# Patient Record
Sex: FEMALE | Race: WHITE | NOT HISPANIC OR LATINO | URBAN - METROPOLITAN AREA
[De-identification: names, ages, dates, MRNs, and addresses within clinical notes are randomized per-mention and may not be internally consistent; named-entity substitution may affect disease eponyms.]

---

## 2017-09-14 ENCOUNTER — OUTPATIENT (OUTPATIENT)
Dept: OUTPATIENT SERVICES | Facility: HOSPITAL | Age: 72
LOS: 1 days | End: 2017-09-14
Payer: COMMERCIAL

## 2017-09-14 ENCOUNTER — APPOINTMENT (OUTPATIENT)
Dept: CV DIAGNOSITCS | Facility: HOSPITAL | Age: 72
End: 2017-09-14

## 2017-09-14 DIAGNOSIS — I10 ESSENTIAL (PRIMARY) HYPERTENSION: ICD-10-CM

## 2017-09-14 PROCEDURE — 93306 TTE W/DOPPLER COMPLETE: CPT | Mod: 26

## 2017-09-14 PROCEDURE — 93306 TTE W/DOPPLER COMPLETE: CPT

## 2018-09-13 ENCOUNTER — INPATIENT (INPATIENT)
Facility: HOSPITAL | Age: 73
LOS: 3 days | Discharge: HOME HEALTH | DRG: 641 | End: 2018-09-17
Attending: INTERNAL MEDICINE | Admitting: INTERNAL MEDICINE
Payer: COMMERCIAL

## 2018-09-13 VITALS
HEIGHT: 61 IN | DIASTOLIC BLOOD PRESSURE: 73 MMHG | TEMPERATURE: 98 F | RESPIRATION RATE: 18 BRPM | HEART RATE: 55 BPM | WEIGHT: 132.06 LBS | OXYGEN SATURATION: 98 % | SYSTOLIC BLOOD PRESSURE: 118 MMHG

## 2018-09-13 DIAGNOSIS — E87.1 HYPO-OSMOLALITY AND HYPONATREMIA: ICD-10-CM

## 2018-09-13 DIAGNOSIS — C55 MALIGNANT NEOPLASM OF UTERUS, PART UNSPECIFIED: ICD-10-CM

## 2018-09-13 DIAGNOSIS — Z87.39 PERSONAL HISTORY OF OTHER DISEASES OF THE MUSCULOSKELETAL SYSTEM AND CONNECTIVE TISSUE: ICD-10-CM

## 2018-09-13 DIAGNOSIS — E87.6 HYPOKALEMIA: ICD-10-CM

## 2018-09-13 DIAGNOSIS — I10 ESSENTIAL (PRIMARY) HYPERTENSION: ICD-10-CM

## 2018-09-13 LAB
ALBUMIN SERPL ELPH-MCNC: 4.7 G/DL — SIGNIFICANT CHANGE UP (ref 3.3–5)
ALP SERPL-CCNC: 52 U/L — SIGNIFICANT CHANGE UP (ref 40–120)
ALT FLD-CCNC: 12 U/L — SIGNIFICANT CHANGE UP (ref 10–45)
ANION GAP SERPL CALC-SCNC: 13 MMOL/L — SIGNIFICANT CHANGE UP (ref 5–17)
ANION GAP SERPL CALC-SCNC: 14 MMOL/L — SIGNIFICANT CHANGE UP (ref 5–17)
ANION GAP SERPL CALC-SCNC: 16 MMOL/L — SIGNIFICANT CHANGE UP (ref 5–17)
APPEARANCE UR: CLEAR — SIGNIFICANT CHANGE UP
APTT BLD: 30.4 SEC — SIGNIFICANT CHANGE UP (ref 27.5–37.4)
AST SERPL-CCNC: 21 U/L — SIGNIFICANT CHANGE UP (ref 10–40)
BACTERIA # UR AUTO: NEGATIVE — SIGNIFICANT CHANGE UP
BASOPHILS # BLD AUTO: 0 K/UL — SIGNIFICANT CHANGE UP (ref 0–0.2)
BASOPHILS NFR BLD AUTO: 0.6 % — SIGNIFICANT CHANGE UP (ref 0–2)
BILIRUB SERPL-MCNC: 0.7 MG/DL — SIGNIFICANT CHANGE UP (ref 0.2–1.2)
BILIRUB UR-MCNC: NEGATIVE — SIGNIFICANT CHANGE UP
BUN SERPL-MCNC: 5 MG/DL — LOW (ref 7–23)
BUN SERPL-MCNC: 6 MG/DL — LOW (ref 7–23)
BUN SERPL-MCNC: 6 MG/DL — LOW (ref 7–23)
CALCIUM SERPL-MCNC: 8.9 MG/DL — SIGNIFICANT CHANGE UP (ref 8.4–10.5)
CALCIUM SERPL-MCNC: 9.6 MG/DL — SIGNIFICANT CHANGE UP (ref 8.4–10.5)
CALCIUM SERPL-MCNC: 9.7 MG/DL — SIGNIFICANT CHANGE UP (ref 8.4–10.5)
CHLORIDE SERPL-SCNC: 71 MMOL/L — LOW (ref 96–108)
CHLORIDE SERPL-SCNC: 73 MMOL/L — LOW (ref 96–108)
CHLORIDE SERPL-SCNC: 90 MMOL/L — LOW (ref 96–108)
CO2 SERPL-SCNC: 26 MMOL/L — SIGNIFICANT CHANGE UP (ref 22–31)
CO2 SERPL-SCNC: 28 MMOL/L — SIGNIFICANT CHANGE UP (ref 22–31)
CO2 SERPL-SCNC: 29 MMOL/L — SIGNIFICANT CHANGE UP (ref 22–31)
COLOR SPEC: SIGNIFICANT CHANGE UP
CREAT ?TM UR-MCNC: 8 MG/DL — SIGNIFICANT CHANGE UP
CREAT SERPL-MCNC: 0.49 MG/DL — LOW (ref 0.5–1.3)
CREAT SERPL-MCNC: 0.51 MG/DL — SIGNIFICANT CHANGE UP (ref 0.5–1.3)
CREAT SERPL-MCNC: 0.51 MG/DL — SIGNIFICANT CHANGE UP (ref 0.5–1.3)
DIFF PNL FLD: NEGATIVE — SIGNIFICANT CHANGE UP
EOSINOPHIL # BLD AUTO: 0 K/UL — SIGNIFICANT CHANGE UP (ref 0–0.5)
EOSINOPHIL NFR BLD AUTO: 0.4 % — SIGNIFICANT CHANGE UP (ref 0–6)
EPI CELLS # UR: 0 /HPF — SIGNIFICANT CHANGE UP
GLUCOSE SERPL-MCNC: 103 MG/DL — HIGH (ref 70–99)
GLUCOSE SERPL-MCNC: 104 MG/DL — HIGH (ref 70–99)
GLUCOSE SERPL-MCNC: 127 MG/DL — HIGH (ref 70–99)
GLUCOSE UR QL: NEGATIVE — SIGNIFICANT CHANGE UP
HCT VFR BLD CALC: 40.3 % — SIGNIFICANT CHANGE UP (ref 34.5–45)
HGB BLD-MCNC: 14.6 G/DL — SIGNIFICANT CHANGE UP (ref 11.5–15.5)
HYALINE CASTS # UR AUTO: 0 /LPF — SIGNIFICANT CHANGE UP (ref 0–2)
INR BLD: 1.1 RATIO — SIGNIFICANT CHANGE UP (ref 0.88–1.16)
KETONES UR-MCNC: NEGATIVE — SIGNIFICANT CHANGE UP
LACTATE BLDV-MCNC: 2.2 MMOL/L — HIGH (ref 0.7–2)
LEUKOCYTE ESTERASE UR-ACNC: NEGATIVE — SIGNIFICANT CHANGE UP
LIDOCAIN IGE QN: 90 U/L — HIGH (ref 7–60)
LYMPHOCYTES # BLD AUTO: 1.4 K/UL — SIGNIFICANT CHANGE UP (ref 1–3.3)
LYMPHOCYTES # BLD AUTO: 22.1 % — SIGNIFICANT CHANGE UP (ref 13–44)
MAGNESIUM SERPL-MCNC: 2 MG/DL — SIGNIFICANT CHANGE UP (ref 1.6–2.6)
MCHC RBC-ENTMCNC: 31.3 PG — SIGNIFICANT CHANGE UP (ref 27–34)
MCHC RBC-ENTMCNC: 36.3 GM/DL — HIGH (ref 32–36)
MCV RBC AUTO: 86.1 FL — SIGNIFICANT CHANGE UP (ref 80–100)
MONOCYTES # BLD AUTO: 0.9 K/UL — SIGNIFICANT CHANGE UP (ref 0–0.9)
MONOCYTES NFR BLD AUTO: 14.8 % — HIGH (ref 2–14)
NEUTROPHILS # BLD AUTO: 3.9 K/UL — SIGNIFICANT CHANGE UP (ref 1.8–7.4)
NEUTROPHILS NFR BLD AUTO: 62 % — SIGNIFICANT CHANGE UP (ref 43–77)
NITRITE UR-MCNC: NEGATIVE — SIGNIFICANT CHANGE UP
OSMOLALITY SERPL: 240 MOS/KG — LOW (ref 275–300)
OSMOLALITY UR: 122 MOS/KG — LOW (ref 300–900)
OSMOLALITY UR: 146 MOS/KG — SIGNIFICANT CHANGE UP (ref 50–1200)
PH UR: 7.5 — SIGNIFICANT CHANGE UP (ref 5–8)
PLATELET # BLD AUTO: 325 K/UL — SIGNIFICANT CHANGE UP (ref 150–400)
POTASSIUM SERPL-MCNC: 2.8 MMOL/L — CRITICAL LOW (ref 3.5–5.3)
POTASSIUM SERPL-MCNC: 2.9 MMOL/L — CRITICAL LOW (ref 3.5–5.3)
POTASSIUM SERPL-MCNC: 3.3 MMOL/L — LOW (ref 3.5–5.3)
POTASSIUM SERPL-SCNC: 2.8 MMOL/L — CRITICAL LOW (ref 3.5–5.3)
POTASSIUM SERPL-SCNC: 2.9 MMOL/L — CRITICAL LOW (ref 3.5–5.3)
POTASSIUM SERPL-SCNC: 3.3 MMOL/L — LOW (ref 3.5–5.3)
POTASSIUM UR-SCNC: 7 MMOL/L — SIGNIFICANT CHANGE UP
PROT SERPL-MCNC: 7.5 G/DL — SIGNIFICANT CHANGE UP (ref 6–8.3)
PROT UR-MCNC: NEGATIVE — SIGNIFICANT CHANGE UP
PROTHROM AB SERPL-ACNC: 11.9 SEC — SIGNIFICANT CHANGE UP (ref 9.8–12.7)
RBC # BLD: 4.68 M/UL — SIGNIFICANT CHANGE UP (ref 3.8–5.2)
RBC # FLD: 11.5 % — SIGNIFICANT CHANGE UP (ref 10.3–14.5)
RBC CASTS # UR COMP ASSIST: 2 /HPF — SIGNIFICANT CHANGE UP (ref 0–4)
SODIUM SERPL-SCNC: 115 MMOL/L — CRITICAL LOW (ref 135–145)
SODIUM SERPL-SCNC: 116 MMOL/L — CRITICAL LOW (ref 135–145)
SODIUM SERPL-SCNC: 129 MMOL/L — LOW (ref 135–145)
SODIUM UR-SCNC: 20 MMOL/L — SIGNIFICANT CHANGE UP
SODIUM UR-SCNC: 35 MMOL/L — SIGNIFICANT CHANGE UP
SP GR SPEC: 1.01 — SIGNIFICANT CHANGE UP (ref 1.01–1.02)
UROBILINOGEN FLD QL: NEGATIVE — SIGNIFICANT CHANGE UP
WBC # BLD: 6.3 K/UL — SIGNIFICANT CHANGE UP (ref 3.8–10.5)
WBC # FLD AUTO: 6.3 K/UL — SIGNIFICANT CHANGE UP (ref 3.8–10.5)
WBC UR QL: 1 /HPF — SIGNIFICANT CHANGE UP (ref 0–5)

## 2018-09-13 PROCEDURE — 93010 ELECTROCARDIOGRAM REPORT: CPT

## 2018-09-13 PROCEDURE — 74177 CT ABD & PELVIS W/CONTRAST: CPT | Mod: 26

## 2018-09-13 PROCEDURE — 99285 EMERGENCY DEPT VISIT HI MDM: CPT | Mod: 25

## 2018-09-13 RX ORDER — POTASSIUM CHLORIDE 20 MEQ
10 PACKET (EA) ORAL
Qty: 0 | Refills: 0 | Status: COMPLETED | OUTPATIENT
Start: 2018-09-13 | End: 2018-09-14

## 2018-09-13 RX ORDER — POTASSIUM CHLORIDE 20 MEQ
40 PACKET (EA) ORAL EVERY 4 HOURS
Qty: 0 | Refills: 0 | Status: COMPLETED | OUTPATIENT
Start: 2018-09-13 | End: 2018-09-14

## 2018-09-13 RX ORDER — INFLUENZA VIRUS VACCINE 15; 15; 15; 15 UG/.5ML; UG/.5ML; UG/.5ML; UG/.5ML
0.5 SUSPENSION INTRAMUSCULAR ONCE
Qty: 0 | Refills: 0 | Status: COMPLETED | OUTPATIENT
Start: 2018-09-13 | End: 2018-09-17

## 2018-09-13 RX ORDER — METOPROLOL TARTRATE 50 MG
100 TABLET ORAL
Qty: 0 | Refills: 0 | COMMUNITY

## 2018-09-13 RX ORDER — ENOXAPARIN SODIUM 100 MG/ML
40 INJECTION SUBCUTANEOUS DAILY
Qty: 0 | Refills: 0 | Status: DISCONTINUED | OUTPATIENT
Start: 2018-09-13 | End: 2018-09-17

## 2018-09-13 RX ORDER — POTASSIUM CHLORIDE 20 MEQ
10 PACKET (EA) ORAL ONCE
Qty: 0 | Refills: 0 | Status: COMPLETED | OUTPATIENT
Start: 2018-09-13 | End: 2018-09-13

## 2018-09-13 RX ORDER — SODIUM CHLORIDE 5 G/100ML
500 INJECTION, SOLUTION INTRAVENOUS
Qty: 0 | Refills: 0 | Status: DISCONTINUED | OUTPATIENT
Start: 2018-09-13 | End: 2018-09-13

## 2018-09-13 RX ORDER — POTASSIUM CHLORIDE 20 MEQ
20 PACKET (EA) ORAL ONCE
Qty: 0 | Refills: 0 | Status: DISCONTINUED | OUTPATIENT
Start: 2018-09-13 | End: 2018-09-13

## 2018-09-13 RX ORDER — SODIUM CHLORIDE 9 MG/ML
1000 INJECTION INTRAMUSCULAR; INTRAVENOUS; SUBCUTANEOUS ONCE
Qty: 0 | Refills: 0 | Status: COMPLETED | OUTPATIENT
Start: 2018-09-13 | End: 2018-09-13

## 2018-09-13 RX ORDER — POTASSIUM CHLORIDE 20 MEQ
40 PACKET (EA) ORAL ONCE
Qty: 0 | Refills: 0 | Status: DISCONTINUED | OUTPATIENT
Start: 2018-09-13 | End: 2018-09-13

## 2018-09-13 RX ORDER — POTASSIUM CHLORIDE 20 MEQ
40 PACKET (EA) ORAL ONCE
Qty: 0 | Refills: 0 | Status: COMPLETED | OUTPATIENT
Start: 2018-09-13 | End: 2018-09-13

## 2018-09-13 RX ORDER — ACETAMINOPHEN 500 MG
1000 TABLET ORAL ONCE
Qty: 0 | Refills: 0 | Status: COMPLETED | OUTPATIENT
Start: 2018-09-13 | End: 2018-09-13

## 2018-09-13 RX ORDER — PANTOPRAZOLE SODIUM 20 MG/1
40 TABLET, DELAYED RELEASE ORAL
Qty: 0 | Refills: 0 | Status: DISCONTINUED | OUTPATIENT
Start: 2018-09-13 | End: 2018-09-17

## 2018-09-13 RX ORDER — ZOLPIDEM TARTRATE 10 MG/1
5 TABLET ORAL AT BEDTIME
Qty: 0 | Refills: 0 | Status: DISCONTINUED | OUTPATIENT
Start: 2018-09-13 | End: 2018-09-17

## 2018-09-13 RX ORDER — OXYCODONE AND ACETAMINOPHEN 5; 325 MG/1; MG/1
2 TABLET ORAL EVERY 4 HOURS
Qty: 0 | Refills: 0 | Status: DISCONTINUED | OUTPATIENT
Start: 2018-09-13 | End: 2018-09-17

## 2018-09-13 RX ORDER — DULOXETINE HYDROCHLORIDE 30 MG/1
1 CAPSULE, DELAYED RELEASE ORAL
Qty: 0 | Refills: 0 | COMMUNITY

## 2018-09-13 RX ORDER — SODIUM CHLORIDE 9 MG/ML
1000 INJECTION, SOLUTION INTRAVENOUS
Qty: 0 | Refills: 0 | Status: DISCONTINUED | OUTPATIENT
Start: 2018-09-13 | End: 2018-09-14

## 2018-09-13 RX ORDER — POTASSIUM CHLORIDE 20 MEQ
1 PACKET (EA) ORAL
Qty: 0 | Refills: 0 | COMMUNITY

## 2018-09-13 RX ORDER — LISINOPRIL 2.5 MG/1
20 TABLET ORAL DAILY
Qty: 0 | Refills: 0 | Status: DISCONTINUED | OUTPATIENT
Start: 2018-09-13 | End: 2018-09-13

## 2018-09-13 RX ADMIN — PANTOPRAZOLE SODIUM 40 MILLIGRAM(S): 20 TABLET, DELAYED RELEASE ORAL at 18:49

## 2018-09-13 RX ADMIN — Medication 1000 MILLIGRAM(S): at 11:43

## 2018-09-13 RX ADMIN — Medication 40 MILLIEQUIVALENT(S): at 12:34

## 2018-09-13 RX ADMIN — Medication 40 MILLIEQUIVALENT(S): at 23:51

## 2018-09-13 RX ADMIN — ZOLPIDEM TARTRATE 5 MILLIGRAM(S): 10 TABLET ORAL at 20:22

## 2018-09-13 RX ADMIN — Medication 1000 MILLIGRAM(S): at 11:36

## 2018-09-13 RX ADMIN — Medication 100 MILLIEQUIVALENT(S): at 13:15

## 2018-09-13 RX ADMIN — SODIUM CHLORIDE 1000 MILLILITER(S): 9 INJECTION INTRAMUSCULAR; INTRAVENOUS; SUBCUTANEOUS at 12:43

## 2018-09-13 RX ADMIN — SODIUM CHLORIDE 1000 MILLILITER(S): 9 INJECTION INTRAMUSCULAR; INTRAVENOUS; SUBCUTANEOUS at 10:14

## 2018-09-13 RX ADMIN — Medication 400 MILLIGRAM(S): at 10:13

## 2018-09-13 RX ADMIN — SODIUM CHLORIDE 30 MILLILITER(S): 5 INJECTION, SOLUTION INTRAVENOUS at 18:49

## 2018-09-13 RX ADMIN — ENOXAPARIN SODIUM 40 MILLIGRAM(S): 100 INJECTION SUBCUTANEOUS at 18:49

## 2018-09-13 RX ADMIN — Medication 100 MILLIEQUIVALENT(S): at 23:51

## 2018-09-13 NOTE — ED PROVIDER NOTE - PMH
Age Related Osteoporosis    History of Scoliosis  with dietrich's mateo placement about 13 years ago  HTN (Hypertension)    Malignant Neoplasm of Corpus Uteri

## 2018-09-13 NOTE — ED PROCEDURE NOTE - PROCEDURE ADDITIONAL DETAILS
Emergency Department Focused Ultrasound performed at patient's bedside for educational purposes. The study will have a follow up study performed or was performed in the direct supervision of an ultrasound trained attending.  - neg fast  - ruq: no cholecystitis  -bladder and Renal: no hydro, volume post void bladder 350cc

## 2018-09-13 NOTE — ED ADULT NURSE NOTE - OBJECTIVE STATEMENT
72 yr old 72 F sent in by PMD for Na of 115.  Pt c/o  abdominal pain and constipation for 5 days. Pt has normal constipation, however never with pain. Also complaining of "just not feeling well" and poor balance. Has poor appetite, has been passing gas.  Pain is described in "mid abdomen" and radiates to RLQ. Denies vomiting,  dysuria, blood in stool or urine, double vision. Denies numbness or weakness. No facial droop or slurred speech. Afebrile Denies fever or chills.Takes hydrocodone for chronic pain due to scoliosis. PMD Peter Perdik

## 2018-09-13 NOTE — H&P ADULT - PROBLEM SELECTOR PLAN 1
severe  will send urine and serum osm studies  renal evaluation called Dr Mtz to see  avoid too rapid a correction of Na  patient given NS in Emergency Department before admission severe  likely secondary to Cymbalta  will discontinue same  will send urine and serum osm studies  renal evaluation called Dr Mtz to see  avoid too rapid a correction of Na  patient given NS in Emergency Department before admission

## 2018-09-13 NOTE — ED PROVIDER NOTE - OBJECTIVE STATEMENT
72 F sent in by PMD for Na of 115. complaining of abdominal pain and constipation for 5 days; is constipated at baseline but not with pain. Also complaining of "just not feeling well" and poor balance. Has poor appetite, has been passing gas.  Pain is described in "mid abdomen" and radiates to RLQ. Denies vomiting, f/c, dysuria, blood in stool or urine, dystonia/dysphagia, double vision. Takes hydrocodone for chronic pain due to scoliosis. PMD Peter Perdik

## 2018-09-13 NOTE — H&P ADULT - NSHPPHYSICALEXAM_GEN_ALL_CORE
PHYSICAL EXAM: vital signs as above  in no apparent distress  HEENT: CINDY EOMI dry oral mucosa  Neck: Supple, no JVD, no thyromegaly  Lungs: no rhonchi, no wheeze, no crackles  CVS: S1 S2 soft ejection systolic murmur best heard at left sternal border, fletcher  Abdomen: no tenderness, no organomegaly, BS present   left paraumbilical chronic reducible hernia  Neuro: AO x 3 no focal weakness, no sensory abnormalities  Psych: appropriate affect  Skin: warm, dry  Ext: no cyanosis or clubbing, no edema  Msk: no joint swelling or deformities  Back: no CVA tenderness, no kyphosis/scoliosis

## 2018-09-13 NOTE — H&P ADULT - ASSESSMENT
72 F with a PMH uterine ca in remission, HTN, chronic back pain sent in by PMD for a sodium level of 115. For the last 10 days she has been complaining of abdominal pain and constipation for 5 days. Labs significant for severe hyponatremia and hypokalemia, patient denies excessive water intake

## 2018-09-13 NOTE — H&P ADULT - NSHPREVIEWOFSYSTEMS_GEN_ALL_CORE
Gen: no loss of wt + loss of appetite  ENT: no dizziness no hearing loss  Ophth: no blurring of vision no loss of vision  Resp: No cough no sputum production  CVS: No chest pain no palpitations no orthopnea  GI: see above HPI   : no dysuria, hematuria  Endo: no polyuria no excessive sweating  Neuro: no weakness no paresthesias poor balance while walking  Psych: No suicidal no depressive ideation  Heme: No petechiae no easy bruising  Msk: No joint pain no swelling  Skin: No rash no itching  All other ROS negative

## 2018-09-13 NOTE — CHART NOTE - NSCHARTNOTEFT_GEN_A_CORE
Pt was seen and examined.  Briefly this is a female c hx HTN uterine cancer pw Hyponatremia that is euvolemic-  Likely a combination of SIADH(Lexapro + nausea) and "tea and toast" diet    Suggest  -3% NS at 30 cc/hr for 10 hours  -Serum sodium q 6 hours.    -Check urine lytes and osmolarity  -Check TSH  -Add Ensure Enlive and pudding  -DC Lisinopril given soft BP      Sayed Smith  [facility]  [phone]

## 2018-09-13 NOTE — CHART NOTE - NSCHARTNOTEFT_GEN_A_CORE
Informed Nephrology MD Gonzalez that the Na corrected from 115 to 129, the 3% hypertonic saline was started at 6 pm  as per chart review and at 22:00 Na is 129.   -Will stop hypertonic saline  -Will start D5W at 200 until 7am   -Will f/u with BMP     -Nephro to follow in AM     Samira [place] [id number]

## 2018-09-13 NOTE — H&P ADULT - PSH
History of Hemorrhoidectomy    History of Tonsillectomy    S/P Appendectomy    S/P Cervical Polypectomy    S/P  Section   &   s/p left shoulder surgery    S/P Wrist Surgery  right side  Scoliosis of Thoracic Spine  s/p dietrich's mateo placement 13 years ago

## 2018-09-13 NOTE — ED PROVIDER NOTE - MEDICAL DECISION MAKING DETAILS
Patient with hyponatremia for which she was sent in, also having decreased PO intake and constipation/ABD pain. On exam, has LLQ and periumbilical tenderness. Concern for SBO vs stool impaction and electrolyte abnormalities. Will CT, labs, give fluids. Mary: Patient with hyponatremia for which she was sent in, also having decreased PO intake and constipation/ABD pain. On exam, has LLQ and periumbilical tenderness. Concern for SBO vs stool impaction and electrolyte abnormalities. Will CT, labs, give fluids.

## 2018-09-13 NOTE — ED ADULT NURSE NOTE - NSIMPLEMENTINTERV_GEN_ALL_ED
Implemented All Universal Safety Interventions:  [place] to call system. Call bell, personal items and telephone within reach. Instruct patient to call for assistance. Room bathroom lighting operational. Non-slip footwear when patient is off stretcher. Physically safe environment: no spills, clutter or unnecessary equipment. Stretcher in lowest position, wheels locked, appropriate side rails in place.

## 2018-09-13 NOTE — H&P ADULT - HISTORY OF PRESENT ILLNESS
72 F with a PMH uterine ca in remission, HTN, chronic back pain sent in by PMD for a sodium level of 115. For the last 10 days she has been complaining of abdominal pain and constipation for 5 days. She states she is constipated at baseline but does not usually have pain. Also complaining of "just not feeling well" and poor balance. She has no appetite. She has no nausea, vomiting and has been passing gas.  Pain is described in "mid abdomen" and radiates to RLQ. Denies vomiting, f/c, dysuria, blood in stool or urine, dystonia/dysphagia, double vision. Takes hydrocodone for chronic pain due to scoliosis. PMD Rey Pratt. Of note she has been started on Cymbalta 7 days prior by her PCP

## 2018-09-14 LAB
ANION GAP SERPL CALC-SCNC: 11 MMOL/L — SIGNIFICANT CHANGE UP (ref 5–17)
ANION GAP SERPL CALC-SCNC: 12 MMOL/L — SIGNIFICANT CHANGE UP (ref 5–17)
ANION GAP SERPL CALC-SCNC: 12 MMOL/L — SIGNIFICANT CHANGE UP (ref 5–17)
ANION GAP SERPL CALC-SCNC: 9 MMOL/L — SIGNIFICANT CHANGE UP (ref 5–17)
BUN SERPL-MCNC: 5 MG/DL — LOW (ref 7–23)
BUN SERPL-MCNC: 8 MG/DL — SIGNIFICANT CHANGE UP (ref 7–23)
BUN SERPL-MCNC: 9 MG/DL — SIGNIFICANT CHANGE UP (ref 7–23)
BUN SERPL-MCNC: <4 MG/DL — LOW (ref 7–23)
CALCIUM SERPL-MCNC: 8.8 MG/DL — SIGNIFICANT CHANGE UP (ref 8.4–10.5)
CALCIUM SERPL-MCNC: 9.3 MG/DL — SIGNIFICANT CHANGE UP (ref 8.4–10.5)
CALCIUM SERPL-MCNC: 9.4 MG/DL — SIGNIFICANT CHANGE UP (ref 8.4–10.5)
CALCIUM SERPL-MCNC: 9.5 MG/DL — SIGNIFICANT CHANGE UP (ref 8.4–10.5)
CHLORIDE SERPL-SCNC: 86 MMOL/L — LOW (ref 96–108)
CHLORIDE SERPL-SCNC: 88 MMOL/L — LOW (ref 96–108)
CHLORIDE SERPL-SCNC: 92 MMOL/L — LOW (ref 96–108)
CHLORIDE SERPL-SCNC: 93 MMOL/L — LOW (ref 96–108)
CO2 SERPL-SCNC: 25 MMOL/L — SIGNIFICANT CHANGE UP (ref 22–31)
CO2 SERPL-SCNC: 26 MMOL/L — SIGNIFICANT CHANGE UP (ref 22–31)
CORTIS AM PEAK SERPL-MCNC: 11.8 UG/DL — SIGNIFICANT CHANGE UP (ref 6–18.4)
CREAT SERPL-MCNC: 0.45 MG/DL — LOW (ref 0.5–1.3)
CREAT SERPL-MCNC: 0.49 MG/DL — LOW (ref 0.5–1.3)
GLUCOSE SERPL-MCNC: 105 MG/DL — HIGH (ref 70–99)
GLUCOSE SERPL-MCNC: 123 MG/DL — HIGH (ref 70–99)
GLUCOSE SERPL-MCNC: 152 MG/DL — HIGH (ref 70–99)
GLUCOSE SERPL-MCNC: 179 MG/DL — HIGH (ref 70–99)
HCT VFR BLD CALC: 33.4 % — LOW (ref 34.5–45)
HGB BLD-MCNC: 12.2 G/DL — SIGNIFICANT CHANGE UP (ref 11.5–15.5)
MCHC RBC-ENTMCNC: 30.8 PG — SIGNIFICANT CHANGE UP (ref 27–34)
MCHC RBC-ENTMCNC: 36.5 GM/DL — HIGH (ref 32–36)
MCV RBC AUTO: 84.3 FL — SIGNIFICANT CHANGE UP (ref 80–100)
PLATELET # BLD AUTO: 213 K/UL — SIGNIFICANT CHANGE UP (ref 150–400)
POTASSIUM SERPL-MCNC: 3.5 MMOL/L — SIGNIFICANT CHANGE UP (ref 3.5–5.3)
POTASSIUM SERPL-MCNC: 3.6 MMOL/L — SIGNIFICANT CHANGE UP (ref 3.5–5.3)
POTASSIUM SERPL-MCNC: 4.2 MMOL/L — SIGNIFICANT CHANGE UP (ref 3.5–5.3)
POTASSIUM SERPL-MCNC: 4.6 MMOL/L — SIGNIFICANT CHANGE UP (ref 3.5–5.3)
POTASSIUM SERPL-SCNC: 3.5 MMOL/L — SIGNIFICANT CHANGE UP (ref 3.5–5.3)
POTASSIUM SERPL-SCNC: 3.6 MMOL/L — SIGNIFICANT CHANGE UP (ref 3.5–5.3)
POTASSIUM SERPL-SCNC: 4.2 MMOL/L — SIGNIFICANT CHANGE UP (ref 3.5–5.3)
POTASSIUM SERPL-SCNC: 4.6 MMOL/L — SIGNIFICANT CHANGE UP (ref 3.5–5.3)
RBC # BLD: 3.96 M/UL — SIGNIFICANT CHANGE UP (ref 3.8–5.2)
RBC # FLD: 12.3 % — SIGNIFICANT CHANGE UP (ref 10.3–14.5)
SODIUM SERPL-SCNC: 121 MMOL/L — LOW (ref 135–145)
SODIUM SERPL-SCNC: 125 MMOL/L — LOW (ref 135–145)
SODIUM SERPL-SCNC: 129 MMOL/L — LOW (ref 135–145)
SODIUM SERPL-SCNC: 131 MMOL/L — LOW (ref 135–145)
TSH SERPL-MCNC: 1.48 UIU/ML — SIGNIFICANT CHANGE UP (ref 0.27–4.2)
WBC # BLD: 4.5 K/UL — SIGNIFICANT CHANGE UP (ref 3.8–10.5)
WBC # FLD AUTO: 4.5 K/UL — SIGNIFICANT CHANGE UP (ref 3.8–10.5)

## 2018-09-14 RX ORDER — POTASSIUM CHLORIDE 20 MEQ
40 PACKET (EA) ORAL ONCE
Qty: 0 | Refills: 0 | Status: COMPLETED | OUTPATIENT
Start: 2018-09-14 | End: 2018-09-14

## 2018-09-14 RX ORDER — POTASSIUM CHLORIDE 20 MEQ
20 PACKET (EA) ORAL ONCE
Qty: 0 | Refills: 0 | Status: DISCONTINUED | OUTPATIENT
Start: 2018-09-14 | End: 2018-09-14

## 2018-09-14 RX ADMIN — SODIUM CHLORIDE 200 MILLILITER(S): 9 INJECTION, SOLUTION INTRAVENOUS at 00:12

## 2018-09-14 RX ADMIN — OXYCODONE AND ACETAMINOPHEN 2 TABLET(S): 5; 325 TABLET ORAL at 14:18

## 2018-09-14 RX ADMIN — OXYCODONE AND ACETAMINOPHEN 2 TABLET(S): 5; 325 TABLET ORAL at 20:18

## 2018-09-14 RX ADMIN — Medication 40 MILLIEQUIVALENT(S): at 10:30

## 2018-09-14 RX ADMIN — PANTOPRAZOLE SODIUM 40 MILLIGRAM(S): 20 TABLET, DELAYED RELEASE ORAL at 06:01

## 2018-09-14 RX ADMIN — Medication 100 MILLIEQUIVALENT(S): at 02:01

## 2018-09-14 RX ADMIN — OXYCODONE AND ACETAMINOPHEN 2 TABLET(S): 5; 325 TABLET ORAL at 13:18

## 2018-09-14 RX ADMIN — Medication 100 MILLIEQUIVALENT(S): at 00:47

## 2018-09-14 RX ADMIN — OXYCODONE AND ACETAMINOPHEN 2 TABLET(S): 5; 325 TABLET ORAL at 19:11

## 2018-09-14 RX ADMIN — ZOLPIDEM TARTRATE 5 MILLIGRAM(S): 10 TABLET ORAL at 00:20

## 2018-09-14 RX ADMIN — ZOLPIDEM TARTRATE 5 MILLIGRAM(S): 10 TABLET ORAL at 22:55

## 2018-09-14 RX ADMIN — Medication 40 MILLIEQUIVALENT(S): at 04:24

## 2018-09-14 RX ADMIN — ENOXAPARIN SODIUM 40 MILLIGRAM(S): 100 INJECTION SUBCUTANEOUS at 13:17

## 2018-09-15 LAB
ANION GAP SERPL CALC-SCNC: 11 MMOL/L — SIGNIFICANT CHANGE UP (ref 5–17)
BUN SERPL-MCNC: 6 MG/DL — LOW (ref 7–23)
CALCIUM SERPL-MCNC: 9.7 MG/DL — SIGNIFICANT CHANGE UP (ref 8.4–10.5)
CHLORIDE SERPL-SCNC: 91 MMOL/L — LOW (ref 96–108)
CO2 SERPL-SCNC: 27 MMOL/L — SIGNIFICANT CHANGE UP (ref 22–31)
CREAT SERPL-MCNC: 0.45 MG/DL — LOW (ref 0.5–1.3)
CULTURE RESULTS: SIGNIFICANT CHANGE UP
GLUCOSE SERPL-MCNC: 105 MG/DL — HIGH (ref 70–99)
HCT VFR BLD CALC: 36.4 % — SIGNIFICANT CHANGE UP (ref 34.5–45)
HGB BLD-MCNC: 12.6 G/DL — SIGNIFICANT CHANGE UP (ref 11.5–15.5)
MCHC RBC-ENTMCNC: 30 PG — SIGNIFICANT CHANGE UP (ref 27–34)
MCHC RBC-ENTMCNC: 34.6 GM/DL — SIGNIFICANT CHANGE UP (ref 32–36)
MCV RBC AUTO: 86.7 FL — SIGNIFICANT CHANGE UP (ref 80–100)
PLATELET # BLD AUTO: 264 K/UL — SIGNIFICANT CHANGE UP (ref 150–400)
POTASSIUM SERPL-MCNC: 3.5 MMOL/L — SIGNIFICANT CHANGE UP (ref 3.5–5.3)
POTASSIUM SERPL-SCNC: 3.5 MMOL/L — SIGNIFICANT CHANGE UP (ref 3.5–5.3)
RBC # BLD: 4.2 M/UL — SIGNIFICANT CHANGE UP (ref 3.8–5.2)
RBC # FLD: 12.7 % — SIGNIFICANT CHANGE UP (ref 10.3–14.5)
SODIUM SERPL-SCNC: 129 MMOL/L — LOW (ref 135–145)
SPECIMEN SOURCE: SIGNIFICANT CHANGE UP
WBC # BLD: 4.98 K/UL — SIGNIFICANT CHANGE UP (ref 3.8–10.5)
WBC # FLD AUTO: 4.98 K/UL — SIGNIFICANT CHANGE UP (ref 3.8–10.5)

## 2018-09-15 RX ORDER — SODIUM CHLORIDE 9 MG/ML
1000 INJECTION INTRAMUSCULAR; INTRAVENOUS; SUBCUTANEOUS
Qty: 0 | Refills: 0 | Status: DISCONTINUED | OUTPATIENT
Start: 2018-09-15 | End: 2018-09-16

## 2018-09-15 RX ORDER — KETOROLAC TROMETHAMINE 30 MG/ML
15 SYRINGE (ML) INJECTION ONCE
Qty: 0 | Refills: 0 | Status: DISCONTINUED | OUTPATIENT
Start: 2018-09-15 | End: 2018-09-15

## 2018-09-15 RX ADMIN — ZOLPIDEM TARTRATE 5 MILLIGRAM(S): 10 TABLET ORAL at 22:21

## 2018-09-15 RX ADMIN — Medication 15 MILLIGRAM(S): at 16:37

## 2018-09-15 RX ADMIN — OXYCODONE AND ACETAMINOPHEN 2 TABLET(S): 5; 325 TABLET ORAL at 07:05

## 2018-09-15 RX ADMIN — SODIUM CHLORIDE 100 MILLILITER(S): 9 INJECTION INTRAMUSCULAR; INTRAVENOUS; SUBCUTANEOUS at 15:35

## 2018-09-15 RX ADMIN — OXYCODONE AND ACETAMINOPHEN 2 TABLET(S): 5; 325 TABLET ORAL at 06:11

## 2018-09-15 RX ADMIN — PANTOPRAZOLE SODIUM 40 MILLIGRAM(S): 20 TABLET, DELAYED RELEASE ORAL at 06:11

## 2018-09-15 RX ADMIN — ENOXAPARIN SODIUM 40 MILLIGRAM(S): 100 INJECTION SUBCUTANEOUS at 11:17

## 2018-09-15 NOTE — PHYSICAL THERAPY INITIAL EVALUATION ADULT - PLANNED THERAPY INTERVENTIONS, PT EVAL
stair training; GOAL: Pt will negotiate up & down 12 stairs independently with unilateral HR & least restrictive AD, in 2 weeks./gait training/balance training

## 2018-09-15 NOTE — PHYSICAL THERAPY INITIAL EVALUATION ADULT - DISCHARGE DISPOSITION, PT EVAL
to be determined following completion of functional eval for transfers and ambulation no skilled PT needs home w/ home PT

## 2018-09-15 NOTE — CHART NOTE - NSCHARTNOTEFT_GEN_A_CORE
Complained dizziness when OOB - resolved once laid back in bed. Denies, nausea, vomiting, SOB, chest pain, diaphoresis                        12.6   4.98  )-----------( 264      ( 15 Sep 2018 07:02 )             36.4     09-15    129<L>  |  91<L>  |  6<L>  ----------------------------<  105<H>  3.5   |  27  |  0.45<L>    Ca    9.7      15 Sep 2018 05:59  Mg     2.0     09-13    PHYSICAL EXAM:  EYES: EOMI, PERRLA, conjunctiva and sclera clear  NECK: Supple, No JVD  CHEST/LUNG: Clear to auscultation bilaterally; No wheeze  HEART: Regular rate and rhythm; No murmurs, rubs, or gallops  ABDOMEN: Soft, Nontender, Nondistended; Bowel sounds present,  Left paraumbilical chronic reducible hernia  EXTREMITIES:  2+ Peripheral Pulses, No clubbing, cyanosis, or edema  PSYCH: AAOx3  NEUROLOGY: non-focal  SKIN: No rashes or lesions    72 F with a PMH uterine ca in remission, HTN, chronic back pain admitted with Hyponatremia -115. Now with Positive orthostatics  Discussed with Dr. Hernandez    Plan:  IVF - 100cc/hr x 1L          Repeat orthostatics in am    [id number]

## 2018-09-15 NOTE — PHYSICAL THERAPY INITIAL EVALUATION ADULT - BALANCE TRAINING, PT EVAL
GOAL: Pt will demonstrate improved dynamic standing balance to good, in order to improve stability, decrease fall risk and increase independence with transfers and ambulation within 2 weeks.

## 2018-09-15 NOTE — PHYSICAL THERAPY INITIAL EVALUATION ADULT - LIVES WITH, PROFILE
Pt lives with spouse in private home with 6 stairs to entrance.  Pt reports she was independent with functional mobility prior to admission./spouse Pt lives with spouse and son in private home with 6 stairs to entrance with bilateral handrails, reports additional flight of stairs to bedroom, with bilateral handrail.  Pt reports she was independent with functional mobility and ambulating without AD prior to admission, however reports recent fall at home/spouse

## 2018-09-15 NOTE — PHYSICAL THERAPY INITIAL EVALUATION ADULT - PERTINENT HX OF CURRENT PROBLEM, REHAB EVAL
72 F with a PMH uterine ca in remission, HTN, chronic back pain sent in by PMD for a sodium level of 115. Pt with c/o abdominal pain for 10 days and constipation for 5 days. Labs significant for severe hyponatremia and hypokalemia, patient denies excessive water intake.

## 2018-09-15 NOTE — PHYSICAL THERAPY INITIAL EVALUATION ADULT - CRITERIA FOR SKILLED THERAPEUTIC INTERVENTIONS
therapy frequency/anticipated equipment needs at discharge/anticipated discharge recommendation/rehab potential/impairments found/functional limitations in following categories/predicted duration of therapy intervention

## 2018-09-16 ENCOUNTER — TRANSCRIPTION ENCOUNTER (OUTPATIENT)
Age: 73
End: 2018-09-16

## 2018-09-16 DIAGNOSIS — I95.1 ORTHOSTATIC HYPOTENSION: ICD-10-CM

## 2018-09-16 LAB
ANION GAP SERPL CALC-SCNC: 13 MMOL/L — SIGNIFICANT CHANGE UP (ref 5–17)
BUN SERPL-MCNC: 12 MG/DL — SIGNIFICANT CHANGE UP (ref 7–23)
CALCIUM SERPL-MCNC: 9.3 MG/DL — SIGNIFICANT CHANGE UP (ref 8.4–10.5)
CHLORIDE SERPL-SCNC: 95 MMOL/L — LOW (ref 96–108)
CO2 SERPL-SCNC: 26 MMOL/L — SIGNIFICANT CHANGE UP (ref 22–31)
CREAT SERPL-MCNC: 0.39 MG/DL — LOW (ref 0.5–1.3)
GLUCOSE SERPL-MCNC: 107 MG/DL — HIGH (ref 70–99)
POTASSIUM SERPL-MCNC: 3.7 MMOL/L — SIGNIFICANT CHANGE UP (ref 3.5–5.3)
POTASSIUM SERPL-SCNC: 3.7 MMOL/L — SIGNIFICANT CHANGE UP (ref 3.5–5.3)
SODIUM SERPL-SCNC: 134 MMOL/L — LOW (ref 135–145)

## 2018-09-16 PROCEDURE — 93010 ELECTROCARDIOGRAM REPORT: CPT

## 2018-09-16 RX ORDER — SODIUM CHLORIDE 9 MG/ML
1000 INJECTION INTRAMUSCULAR; INTRAVENOUS; SUBCUTANEOUS
Qty: 0 | Refills: 0 | Status: DISCONTINUED | OUTPATIENT
Start: 2018-09-16 | End: 2018-09-17

## 2018-09-16 RX ADMIN — PANTOPRAZOLE SODIUM 40 MILLIGRAM(S): 20 TABLET, DELAYED RELEASE ORAL at 06:09

## 2018-09-16 RX ADMIN — OXYCODONE AND ACETAMINOPHEN 2 TABLET(S): 5; 325 TABLET ORAL at 21:45

## 2018-09-16 RX ADMIN — ENOXAPARIN SODIUM 40 MILLIGRAM(S): 100 INJECTION SUBCUTANEOUS at 11:32

## 2018-09-16 RX ADMIN — OXYCODONE AND ACETAMINOPHEN 2 TABLET(S): 5; 325 TABLET ORAL at 12:32

## 2018-09-16 RX ADMIN — ZOLPIDEM TARTRATE 5 MILLIGRAM(S): 10 TABLET ORAL at 21:33

## 2018-09-16 RX ADMIN — OXYCODONE AND ACETAMINOPHEN 2 TABLET(S): 5; 325 TABLET ORAL at 20:58

## 2018-09-16 RX ADMIN — OXYCODONE AND ACETAMINOPHEN 2 TABLET(S): 5; 325 TABLET ORAL at 14:35

## 2018-09-16 RX ADMIN — SODIUM CHLORIDE 100 MILLILITER(S): 9 INJECTION INTRAMUSCULAR; INTRAVENOUS; SUBCUTANEOUS at 20:59

## 2018-09-16 RX ADMIN — ZOLPIDEM TARTRATE 5 MILLIGRAM(S): 10 TABLET ORAL at 23:28

## 2018-09-16 RX ADMIN — SODIUM CHLORIDE 100 MILLILITER(S): 9 INJECTION INTRAMUSCULAR; INTRAVENOUS; SUBCUTANEOUS at 11:32

## 2018-09-16 NOTE — DISCHARGE NOTE ADULT - PATIENT PORTAL LINK FT
You can access the [facility] Patient Portal, offered by [facility], by registering with the following website: [email or web address]

## 2018-09-16 NOTE — DISCHARGE NOTE ADULT - MEDICATION SUMMARY - MEDICATIONS TO STOP TAKING
I will STOP taking the medications listed below when I get home from the hospital:    indapamide 2.5 mg oral tablet  -- 1 tab(s) by mouth once a day (in the morning)    Cymbalta  -- 1 tab(s) by mouth once a day    Cymbalta 30 mg oral delayed release capsule  -- 1 cap(s) by mouth once a day

## 2018-09-16 NOTE — DISCHARGE NOTE ADULT - HOSPITAL COURSE
sitting 72 F with a PMH uterine ca in remission, HTN, chronic back pain sent in by PMD for a sodium level of 115. For the last 10 days she has been complaining of abdominal pain and constipation for 5 days. Labs significant for severe hyponatremia and hypokalemia, patient denies excessive water intake. 72 F with a [facility] uterine ca in remission, HTN, chronic back pain sent in by PMD for a sodium level of 115. For the last 10 days she has been complaining of abdominal pain and constipation for 5 days. Labs significant for severe hyponatremia and hypokalemia, patient denies excessive water intake.     Pt found to be hyponatremic, likely due to Cymbalta and water retention, now improved to 134 today. Hypokalemia resolved. HTN on amlodipine ,  Metropolol, and Lozol at home, off ACEI. Pt will resume metoprolol and amlodipine per renal recommendations. Discontinue Lozol. Uterine cancer in remission without evidence of  recurrence per abdominal CT. Scoliosis with chronic back pain  controlled on home regimen of Percocet. Hospital course complicated by orthostatic hypotension which improved after IV hydration. Constipation relieved with bowel regimen. Pt improved today, tolerating oral diet, denies further dizziness and abdominal discomfort.   PT evaluation, recommends: 72 F with a [facility] uterine ca in remission, HTN, chronic back pain sent in by PMD for a sodium level of 115. For the last 10 days she has been complaining of abdominal pain and constipation for 5 days. Labs significant for severe hyponatremia and hypokalemia, patient denies excessive water intake.     Pt found to be hyponatremic, likely due to Cymbalta and water retention, now improved to 134 today. Hypokalemia resolved. HTN on amlodipine ,  Metropolol, and Lozol at home, off ACEI. Pt will resume metoprolol and amlodipine per renal recommendations. Discontinue Lozol. Uterine cancer in remission without evidence of  recurrence per abdominal CT. Scoliosis with chronic back pain  controlled on home regimen of Percocet. Hospital course complicated by orthostatic hypotension which improved after IV hydration. Constipation relieved with bowel regimen. Pt improved today, tolerating oral diet, denies further dizziness and abdominal discomfort.   PT evaluated Pt may resume her beta blocker  Discharge home to follow up with PCP within 1 week

## 2018-09-16 NOTE — DISCHARGE NOTE ADULT - CARE PROVIDER_API CALL
Rey Pratt), Internal Medicine; Pulmonary Disease  [facility]  [id number] [place] [address]  Phone: [phone]  Fax: [phone]

## 2018-09-16 NOTE — DISCHARGE NOTE ADULT - MEDICATION SUMMARY - MEDICATIONS TO TAKE
I will START or STAY ON the medications listed below when I get home from the hospital:    HYDROcodone-acetaminophen 5 mg-300 mg oral tablet  -- 1 tab(s) by mouth 4 times a day, As Needed  -- Indication: For pain    fosinopril 20 mg oral tablet  -- 1 tab(s) by mouth once a day  -- Indication: For Hypertension    zolpidem 10 mg oral tablet  -- 1 tab(s) by mouth once a day (at bedtime)  -- Indication: For insomnia    Metoprolol Tartrate 100 mg oral tablet  -- 1 tab(s) by mouth 3 times a day  -- Indication: For Hypertension    amLODIPine 10 mg oral tablet  -- 1 tab(s) by mouth once a day  -- Indication: For Hypertension    docusate sodium 100 mg oral capsule  -- 1 cap(s) by mouth once a day, As Needed  -- Indication: For constipation    potassium chloride 10 mEq oral capsule, extended release  -- 1 cap(s) by mouth once a day  -- Indication: For Supplement    Refresh ophthalmic solution  -- 1 drop(s) to each affected eye once a day (in the evening)  -- Indication: For dry eyes    pantoprazole 40 mg oral delayed release tablet  -- 1 tab(s) by mouth once a day  -- Indication: For dyspepsia

## 2018-09-16 NOTE — DISCHARGE NOTE ADULT - CARE PLAN
Principal Discharge DX:	Hyponatremia  Goal:	Improved  Assessment and plan of treatment:	Follow-up with your primary care physician within 1 week. Call for appointment.  Secondary Diagnosis:	Hypokalemia  Goal:	resolved  Assessment and plan of treatment:	Eat potassium-rich foods.  Follow-up with your primary care physician within 1 week. Call for appointment.  Secondary Diagnosis:	HTN (Hypertension)  Assessment and plan of treatment:	Stop Lozol (Indapamide).  Take all medication as prescribed.  Follow up with your medical doctor for routine blood pressure monitoring at your next visit.  Notify your doctor if you have any of the following symptoms:   Dizziness, Lightheadedness, Blurry vision, Headache, Chest pain, Shortness of breath  Secondary Diagnosis:	Orthostatic hypotension  Assessment and plan of treatment:	Follow-up with your primary care physician within 1 week. Call for appointment.  Secondary Diagnosis:	Uterine cancer  Assessment and plan of treatment:	Follow-up with your primary care physician within 1 week. Call for appointment.  Secondary Diagnosis:	Scoliosis of thoracic spine  Assessment and plan of treatment:	Activity as tolerated.  Follow-up with your primary care physician within 1 week. Call for appointment. Principal Discharge DX:	Hyponatremia  Goal:	Improved  Assessment and plan of treatment:	Follow-up with your primary care physician within 1 week. Call for appointment.  Stop Cymbalta and Lozol.  You are NOT on a salt restricted diet  Secondary Diagnosis:	Hypokalemia  Goal:	resolved  Assessment and plan of treatment:	Eat potassium-rich foods.  Follow-up with your primary care physician within 1 week. Call for appointment.  Secondary Diagnosis:	HTN (Hypertension)  Assessment and plan of treatment:	Stop Lozol (Indapamide).  Take all medication as prescribed.  Follow up with your medical doctor for routine blood pressure monitoring at your next visit.  Notify your doctor if you have any of the following symptoms:   Dizziness, Lightheadedness, Blurry vision, Headache, Chest pain, Shortness of breath  Secondary Diagnosis:	Orthostatic hypotension  Assessment and plan of treatment:	Follow-up with your primary care physician within 1 week. Call for appointment.  Secondary Diagnosis:	Uterine cancer  Assessment and plan of treatment:	Follow-up with your primary care physician within 1 week. Call for appointment.  Secondary Diagnosis:	Scoliosis of thoracic spine  Assessment and plan of treatment:	Activity as tolerated.  Follow-up with your primary care physician within 1 week. Call for appointment.

## 2018-09-16 NOTE — DISCHARGE NOTE ADULT - PLAN OF CARE
resolved Eat potassium-rich foods.  Follow-up with your primary care physician within 1 week. Call for appointment. Stop Lozol (Indapamide).  Take all medication as prescribed.  Follow up with your medical doctor for routine blood pressure monitoring at your next visit.  Notify your doctor if you have any of the following symptoms:   Dizziness, Lightheadedness, Blurry vision, Headache, Chest pain, Shortness of breath Follow-up with your primary care physician within 1 week. Call for appointment. Activity as tolerated.  Follow-up with your primary care physician within 1 week. Call for appointment. Improved Follow-up with your primary care physician within 1 week. Call for appointment.  Stop Cymbalta and Lozol.  You are NOT on a salt restricted diet

## 2018-09-17 VITALS — HEART RATE: 130 BPM | SYSTOLIC BLOOD PRESSURE: 158 MMHG | DIASTOLIC BLOOD PRESSURE: 93 MMHG

## 2018-09-17 LAB
ANION GAP SERPL CALC-SCNC: 12 MMOL/L — SIGNIFICANT CHANGE UP (ref 5–17)
BUN SERPL-MCNC: 11 MG/DL — SIGNIFICANT CHANGE UP (ref 7–23)
CALCIUM SERPL-MCNC: 9.1 MG/DL — SIGNIFICANT CHANGE UP (ref 8.4–10.5)
CHLORIDE SERPL-SCNC: 98 MMOL/L — SIGNIFICANT CHANGE UP (ref 96–108)
CO2 SERPL-SCNC: 25 MMOL/L — SIGNIFICANT CHANGE UP (ref 22–31)
CREAT SERPL-MCNC: 0.38 MG/DL — LOW (ref 0.5–1.3)
GLUCOSE SERPL-MCNC: 99 MG/DL — SIGNIFICANT CHANGE UP (ref 70–99)
MAGNESIUM SERPL-MCNC: 2.1 MG/DL — SIGNIFICANT CHANGE UP (ref 1.6–2.6)
POTASSIUM SERPL-MCNC: 3.9 MMOL/L — SIGNIFICANT CHANGE UP (ref 3.5–5.3)
POTASSIUM SERPL-SCNC: 3.9 MMOL/L — SIGNIFICANT CHANGE UP (ref 3.5–5.3)
SODIUM SERPL-SCNC: 135 MMOL/L — SIGNIFICANT CHANGE UP (ref 135–145)

## 2018-09-17 PROCEDURE — 85610 PROTHROMBIN TIME: CPT

## 2018-09-17 PROCEDURE — 74177 CT ABD & PELVIS W/CONTRAST: CPT

## 2018-09-17 PROCEDURE — 82533 TOTAL CORTISOL: CPT

## 2018-09-17 PROCEDURE — 84443 ASSAY THYROID STIM HORMONE: CPT

## 2018-09-17 PROCEDURE — 99285 EMERGENCY DEPT VISIT HI MDM: CPT | Mod: 25

## 2018-09-17 PROCEDURE — 85027 COMPLETE CBC AUTOMATED: CPT

## 2018-09-17 PROCEDURE — 93005 ELECTROCARDIOGRAM TRACING: CPT

## 2018-09-17 PROCEDURE — 83605 ASSAY OF LACTIC ACID: CPT

## 2018-09-17 PROCEDURE — 97161 PT EVAL LOW COMPLEX 20 MIN: CPT

## 2018-09-17 PROCEDURE — 81001 URINALYSIS AUTO W/SCOPE: CPT

## 2018-09-17 PROCEDURE — 82570 ASSAY OF URINE CREATININE: CPT

## 2018-09-17 PROCEDURE — 90686 IIV4 VACC NO PRSV 0.5 ML IM: CPT

## 2018-09-17 PROCEDURE — 80053 COMPREHEN METABOLIC PANEL: CPT

## 2018-09-17 PROCEDURE — 85730 THROMBOPLASTIN TIME PARTIAL: CPT

## 2018-09-17 PROCEDURE — 83735 ASSAY OF MAGNESIUM: CPT

## 2018-09-17 PROCEDURE — 96365 THER/PROPH/DIAG IV INF INIT: CPT | Mod: XU

## 2018-09-17 PROCEDURE — 84133 ASSAY OF URINE POTASSIUM: CPT

## 2018-09-17 PROCEDURE — 87086 URINE CULTURE/COLONY COUNT: CPT

## 2018-09-17 PROCEDURE — 83690 ASSAY OF LIPASE: CPT

## 2018-09-17 PROCEDURE — 83930 ASSAY OF BLOOD OSMOLALITY: CPT

## 2018-09-17 PROCEDURE — 80048 BASIC METABOLIC PNL TOTAL CA: CPT

## 2018-09-17 PROCEDURE — 84300 ASSAY OF URINE SODIUM: CPT

## 2018-09-17 PROCEDURE — 83935 ASSAY OF URINE OSMOLALITY: CPT

## 2018-09-17 RX ORDER — INDAPAMIDE 1.25 MG
1 TABLET ORAL
Qty: 0 | Refills: 0 | COMMUNITY

## 2018-09-17 RX ORDER — METOPROLOL TARTRATE 50 MG
100 TABLET ORAL
Qty: 0 | Refills: 0 | Status: DISCONTINUED | OUTPATIENT
Start: 2018-09-17 | End: 2018-09-17

## 2018-09-17 RX ORDER — DULOXETINE HYDROCHLORIDE 30 MG/1
1 CAPSULE, DELAYED RELEASE ORAL
Qty: 0 | Refills: 0 | COMMUNITY

## 2018-09-17 RX ADMIN — ENOXAPARIN SODIUM 40 MILLIGRAM(S): 100 INJECTION SUBCUTANEOUS at 11:09

## 2018-09-17 RX ADMIN — PANTOPRAZOLE SODIUM 40 MILLIGRAM(S): 20 TABLET, DELAYED RELEASE ORAL at 06:14

## 2018-09-17 RX ADMIN — SODIUM CHLORIDE 100 MILLILITER(S): 9 INJECTION INTRAMUSCULAR; INTRAVENOUS; SUBCUTANEOUS at 06:14

## 2018-09-17 RX ADMIN — OXYCODONE AND ACETAMINOPHEN 2 TABLET(S): 5; 325 TABLET ORAL at 13:55

## 2018-09-17 RX ADMIN — INFLUENZA VIRUS VACCINE 0.5 MILLILITER(S): 15; 15; 15; 15 SUSPENSION INTRAMUSCULAR at 15:45

## 2018-09-17 RX ADMIN — OXYCODONE AND ACETAMINOPHEN 2 TABLET(S): 5; 325 TABLET ORAL at 08:30

## 2018-09-17 RX ADMIN — OXYCODONE AND ACETAMINOPHEN 2 TABLET(S): 5; 325 TABLET ORAL at 13:15

## 2018-09-17 RX ADMIN — OXYCODONE AND ACETAMINOPHEN 2 TABLET(S): 5; 325 TABLET ORAL at 07:59

## 2018-09-17 NOTE — PROGRESS NOTE ADULT - PROBLEM SELECTOR PLAN 2
resolved  discharge home  lifestyle changes and fall precautions reemphasized and educated
unclear etiology  no h/o DM  will monitor   start compression stockings  gentle hydration next 24 hrs  discharge home tomorrow  lifestyle changes and fall precautions emphasized and educated
normal  will continue to monitor
normal  cortisol normal

## 2018-09-17 NOTE — PROGRESS NOTE ADULT - PROBLEM SELECTOR PLAN 5
chronic back pain  will continue home pain med regimen

## 2018-09-17 NOTE — PROGRESS NOTE ADULT - ASSESSMENT
72 F with a PMH uterine ca in remission, HTN, chronic back pain sent in by PMD for a sodium level of 115. For the last 10 days she has been complaining of abdominal pain and constipation for 5 days. Labs significant for severe hyponatremia and hypokalemia, patient denies excessive water intake
72 F with a PMH uterine ca in remission, HTN, chronic back pain sent in by PMD for a sodium level of 115. For the last 10 days she has been complaining of abdominal pain and constipation for 5 days. Labs significant for severe hyponatremia and hypokalemia, patient denies excessive water intake
A 72-year-old female with past medical history of hypertension, uterine cancer presents with failure to thrive.  The patient found to have euvolemic hyponatremia.  Her hyponatremia can be secondary to tea and toast diet.  and water intoxication.  This was not SIADH .       1.  Renal. DC IVF now   2.  GI:  Regular diet;  She is allowed to drink water and tea now;  Cont protein intake.    3.  Cardiovascular: + orthostatic yesterday; Off all BP meds--repeat orthostasis today.
A 72-year-old female with past medical history of hypertension, uterine cancer presents with failure to thrive.  The patient found to have euvolemic hyponatremia.  Her hyponatremia can be secondary to tea and toast diet.  and water intoxication.  This was not SIADH .  Her serum sodium should have improved with NS(but it did not)    The patient has no neurologic sequelae.  Alert and oriented x4.  1.  Renal.  No more fluid today.  SP  3% NS and then D5W.  The patient will avoid liquids without calories.  SMA7 at 5 pm and then again in am  2.  GI:  Regular diet, encourage protein intake.  NO RESTRICTION ON PROTEIN SHAKES AT ALL  3.  Cardiovascular:  The patient with a soft blood pressure, discontinue lisinopril.
72 F with a PMH uterine ca in remission, HTN, chronic back pain sent in by PMD for a sodium level of 115. For the last 10 days she has been complaining of abdominal pain and constipation for 5 days. Labs significant for severe hyponatremia and hypokalemia, patient denies excessive water intake
72 F with a PMH uterine ca in remission, HTN, chronic back pain sent in by PMD for a sodium level of 115. For the last 10 days she has been complaining of abdominal pain and constipation for 5 days. Labs significant for severe hyponatremia and hypokalemia, patient denies excessive water intake

## 2018-09-17 NOTE — PROGRESS NOTE ADULT - PROBLEM SELECTOR PLAN 1
resolved  will continue to monitor  dietary advice reinforced
resolved  will continue to monitor
improved to 129  will continue to monitor daily  no need for q 6 BMP  likely discharge home tomorrow
slowly resolved  urine and serum osm NOT consistent with SIADH  likely secondary to excessive water intake in the face of close to zero salt intake  will reinforce dietary changes

## 2018-09-17 NOTE — PROGRESS NOTE ADULT - REASON FOR ADMISSION
weakness, abdominal pain, decreased appetite x 10 days

## 2018-09-17 NOTE — PROGRESS NOTE ADULT - ATTENDING COMMENTS
discussed with patient in detail, all questions answered  disposition per PT eval
discussed with patient in detail, all questions answered  discussed with family at bedside in detail
discussed with patient in detail, all questions answered
discussed with patient in detail, all questions answered

## 2018-09-17 NOTE — PROGRESS NOTE ADULT - SUBJECTIVE AND OBJECTIVE BOX
Patient is a 72y old  Female who presents with a chief complaint of weakness, abdominal pain, decreased appetite x 10 days (17 Sep 2018 10:27)    SUBJECTIVE / OVERNIGHT EVENTS: no overnight events  states feels normal at last    ROS:  Resp: No cough no sputum production  CVS: No chest pain no palpitations no orthopnea  GI: no N/V/D  : no dysuria, no hematuria  Neuro: no weakness no paresthesias  Heme: No petechiae no easy bruising  Msk: No joint pain no swelling  Skin: No rash no itching        MEDICATIONS  (STANDING):  enoxaparin Injectable 40 milliGRAM(s) SubCutaneous daily  metoprolol tartrate 100 milliGRAM(s) Oral two times a day  pantoprazole    Tablet 40 milliGRAM(s) Oral before breakfast    MEDICATIONS  (PRN):  oxyCODONE    5 mG/acetaminophen 325 mG 2 Tablet(s) Oral every 4 hours PRN Moderate Pain (4 - 6)  zolpidem 5 milliGRAM(s) Oral at bedtime PRN Insomnia  zolpidem 5 milliGRAM(s) Oral at bedtime PRN Insomnia        CAPILLARY BLOOD GLUCOSE        I&O's Summary    16 Sep 2018 07:01  -  17 Sep 2018 07:00  --------------------------------------------------------  IN: 1020 mL / OUT: 0 mL / NET: 1020 mL    17 Sep 2018 07:01  -  17 Sep 2018 18:28  --------------------------------------------------------  IN: 840 mL / OUT: 0 mL / NET: 840 mL        Vital Signs Last 24 Hrs  T(C): 36.4 (17 Sep 2018 03:59), Max: 36.8 (16 Sep 2018 20:40)  T(F): 97.5 (17 Sep 2018 03:59), Max: 98.2 (16 Sep 2018 20:40)  HR: 130 (17 Sep 2018 14:25) (84 - 130)  BP: 158/93 (17 Sep 2018 14:25) (136/83 - 166/99)  BP(mean): --  RR: 18 (17 Sep 2018 10:27) (18 - 18)  SpO2: 96% (17 Sep 2018 10:27) (96% - 97%)     PHYSICAL EXAM: vital signs as above  in no apparent distress  HEENT: CINDY EOMI   Neck: Supple, no JVD, no thyromegaly  Lungs: no rhonchi, no wheeze, no crackles  CVS: S1 S2 soft ejection systolic murmur best heard at left sternal border, fletcher  Abdomen: no tenderness, no organomegaly, BS present   left paraumbilical chronic reducible hernia  Neuro: AO x 3 no focal weakness, no sensory abnormalities  Psych: appropriate affect  Skin: warm, dry  Ext: no cyanosis or clubbing, no edema  Msk: no joint swelling or deformities  Back: no CVA tenderness, no kyphosis/scoliosis    LABS:    09-17    135  |  98  |  11  ----------------------------<  99  3.9   |  25  |  0.38<L>    Ca    9.1      17 Sep 2018 06:21  Mg     2.1     09-17                  All consultant(s) notes reviewed and care discussed with other providers    Contact Number, Dr Hernandez [phone]
NEPHROLOGY - NSN    Patient seen and examined.   + orthostatic, now on IVF    MEDICATIONS  (STANDING):  enoxaparin Injectable 40 milliGRAM(s) SubCutaneous daily  influenza   Vaccine 0.5 milliLiter(s) IntraMuscular once  pantoprazole    Tablet 40 milliGRAM(s) Oral before breakfast  sodium chloride 0.9%. 1000 milliLiter(s) (100 mL/Hr) IV Continuous <Continuous>    VITALS:  T(C): , Max: 36.7 (09-15-18 @ 20:47)  T(F): , Max: 98.1 (09-15-18 @ 20:47)  HR: 81 (09-16-18 @ 04:40)  BP: 122/68 (09-16-18 @ 04:40)  BP(mean): --  RR: 18 (09-16-18 @ 04:40)  SpO2: 97% (09-16-18 @ 04:40)  Wt(kg): --  I and O's:    09-15 @ 07:01  -  09-16 @ 07:00  --------------------------------------------------------  IN: 1500 mL / OUT: 0 mL / NET: 1500 mL    PHYSICAL EXAM:  Constitutional: NAD  Respiratory: CTA B/L  Cardiovascular: S1 and S2, RRR  Gastrointestinal: + BS, soft, NT, ND  Extremities: No peripheral edema  Neurological: AAO x 3, CN 2-12 intact  : No Feldman  Access: Not applicable    LABS:                        12.6   4.98  )-----------( 264      ( 15 Sep 2018 07:02 )             36.4     09-16    134<L>  |  95<L>  |  12  ----------------------------<  107<H>  3.7   |  26  |  0.39<L>    Ca    9.3      16 Sep 2018 06:58        Urine Studies:        RADIOLOGY & ADDITIONAL STUDIES:      ASSESSMENT   A 72-year-old female with past medical history of hypertension, uterine cancer presents with failure to thrive.  The patient found to have euvolemic hyponatremia.  Her hyponatremia can be secondary to tea and toast diet.  and water intoxication.  This was not SIADH .  Her serum sodium should have improved with NS (but it did not). The patient has no neurologic sequelae.  Alert and oriented x4.  1.  Renal. The patient will avoid liquids without calories. Na stable/improving  2.  GI:  Regular diet, encourage protein intake.    3.  Cardiovascular: + orthostatic, no antihypertensives for now. A/W IVF x 24 hours.     Yoana Connelly NP  [facility]  [phone]
NEPHROLOGY-NSN [phone]        Patient seen and examined         MEDICATIONS  (STANDING):  enoxaparin Injectable 40 milliGRAM(s) SubCutaneous daily  influenza   Vaccine 0.5 milliLiter(s) IntraMuscular once  pantoprazole    Tablet 40 milliGRAM(s) Oral before breakfast  potassium chloride    Tablet ER 40 milliEquivalent(s) Oral once  potassium chloride    Tablet ER 20 milliEquivalent(s) Oral once      VITAL:  T(C): , Max: 36.9 (18 @ 19:27)  T(F): , Max: 98.5 (18 @ 19:27)  HR: 69 (18 @ 08:17)  BP: 126/80 (18 @ 08:17)  BP(mean): --  RR: 20 (18 @ 08:17)  SpO2: 95% (18 @ 08:17)  Wt(kg): --    I and O's:        PHYSICAL EXAM:    Constitutional: NAD  HEENT: PERRLA    Neck:  No JVD  Respiratory: CTAB/L  Cardiovascular: S1 and S2  Gastrointestinal: BS+, soft, NT/ND  Extremities: No peripheral edema  Neurological: A/O x 3, no focal deficits  Psychiatric: Normal mood, normal affect  : No Feldman  Skin: No rashes  Access: Not applicable    LABS:                        12.2   4.50  )-----------( 213      ( 14 Sep 2018 07:52 )             33.4     -    121<L>  |  86<L>  |  5<L>  ----------------------------<  179<H>  3.5   |  26  |  0.45<L>    Ca    8.8      14 Sep 2018 04:17  Mg     2.0         TPro  7.5  /  Alb  4.7  /  TBili  0.7  /  DBili  x   /  AST  21  /  ALT  12  /  AlkPhos  52            Urine Studies:  Urinalysis Basic - ( 13 Sep 2018 12:24 )    Color: Light Yellow / Appearance: Clear / S.010 / pH: x  Gluc: x / Ketone: Negative  / Bili: Negative / Urobili: Negative   Blood: x / Protein: Negative / Nitrite: Negative   Leuk Esterase: Negative / RBC: 2 /hpf / WBC 1 /hpf   Sq Epi: x / Non Sq Epi: 0 /hpf / Bacteria: Negative      Osmolality, Random Urine: 122 mos/kg ( @ 20:32)  Sodium, Random Urine: 20 mmol/L ( @ 20:13)  Potassium, Random Urine: 7 mmol/L ( @ 20:13)  Osmolality, Random Urine: 146 mos/kg ( @ 16:22)  Creatinine, Random Urine: 8 mg/dL ( @ 15:15)  Sodium, Random Urine: 35 mmol/L ( @ 15:15)        RADIOLOGY & ADDITIONAL STUDIES:
NEPHROLOGY-NSN [phone]        Patient seen and examined in bed.  She was in good spirits and eating without problems        MEDICATIONS  (STANDING):  enoxaparin Injectable 40 milliGRAM(s) SubCutaneous daily  influenza   Vaccine 0.5 milliLiter(s) IntraMuscular once  pantoprazole    Tablet 40 milliGRAM(s) Oral before breakfast  sodium chloride 0.9%. 1000 milliLiter(s) (100 mL/Hr) IV Continuous <Continuous>      VITAL:  T(C): , Max: 36.8 (09-16-18 @ 20:40)  T(F): , Max: 98.2 (09-16-18 @ 20:40)  HR: 84 (09-17-18 @ 03:59)  BP: 166/99 (09-17-18 @ 03:59)  BP(mean): --  RR: 18 (09-17-18 @ 03:59)  SpO2: 96% (09-17-18 @ 03:59)  Wt(kg): --    I and O's:    09-16 @ 07:01  -  09-17 @ 07:00  --------------------------------------------------------  IN: 1020 mL / OUT: 0 mL / NET: 1020 mL          PHYSICAL EXAM:    Constitutional: NAD  HEENT: PERRLA    Neck:  No JVD  Respiratory: CTAB/L  Cardiovascular: S1 and S2  Gastrointestinal: BS+, soft, NT/ND  Extremities: No peripheral edema  Neurological: A/O x 3, no focal deficits  Psychiatric: Normal mood, normal affect  : No Feldman  Skin: No rashes  Access: Not applicable    LABS:    09-17    135  |  98  |  11  ----------------------------<  99  3.9   |  25  |  0.38<L>    Ca    9.1      17 Sep 2018 06:21  Mg     2.1     09-17            Urine Studies:          RADIOLOGY & ADDITIONAL STUDIES:
Patient is a 72y old  Female who presents with a chief complaint of weakness, abdominal pain, decreased appetite x 10 days (16 Sep 2018 09:44)      SUBJECTIVE / OVERNIGHT EVENTS: no overnight events  states feels better but "not 100%"    ROS:  Resp: No cough no sputum production  CVS: No chest pain no palpitations no orthopnea  GI: no N/V/D  : no dysuria, no hematuria  Neuro: no weakness no paresthesias  Heme: No petechiae no easy bruising  Msk: No joint pain no swelling  Skin: No rash no itching        MEDICATIONS  (STANDING):  enoxaparin Injectable 40 milliGRAM(s) SubCutaneous daily  influenza   Vaccine 0.5 milliLiter(s) IntraMuscular once  pantoprazole    Tablet 40 milliGRAM(s) Oral before breakfast  sodium chloride 0.9%. 1000 milliLiter(s) (100 mL/Hr) IV Continuous <Continuous>    MEDICATIONS  (PRN):  oxyCODONE    5 mG/acetaminophen 325 mG 2 Tablet(s) Oral every 4 hours PRN Moderate Pain (4 - 6)  zolpidem 5 milliGRAM(s) Oral at bedtime PRN Insomnia  zolpidem 5 milliGRAM(s) Oral at bedtime PRN Insomnia        CAPILLARY BLOOD GLUCOSE        I&O's Summary    15 Sep 2018 07:01  -  16 Sep 2018 07:00  --------------------------------------------------------  IN: 1500 mL / OUT: 0 mL / NET: 1500 mL        Vital Signs Last 24 Hrs  T(C): 36.5 (16 Sep 2018 04:40), Max: 36.7 (15 Sep 2018 20:47)  T(F): 97.7 (16 Sep 2018 04:40), Max: 98.1 (15 Sep 2018 20:47)  HR: 81 (16 Sep 2018 04:40) (78 - 81)  BP: 122/68 (16 Sep 2018 04:40) (122/68 - 126/80)  BP(mean): --  RR: 18 (16 Sep 2018 04:40) (18 - 18)  SpO2: 97% (16 Sep 2018 04:40) (96% - 97%)     PHYSICAL EXAM: vital signs as above  in no apparent distress  HEENT: CINDY EOMI   Neck: Supple, no JVD, no thyromegaly  Lungs: no rhonchi, no wheeze, no crackles  CVS: S1 S2 soft ejection systolic murmur best heard at left sternal border, fletcher  Abdomen: no tenderness, no organomegaly, BS present   left paraumbilical chronic reducible hernia  Neuro: AO x 3 no focal weakness, no sensory abnormalities  Psych: appropriate affect  Skin: warm, dry  Ext: no cyanosis or clubbing, no edema  Msk: no joint swelling or deformities  Back: no CVA tenderness, no kyphosis/scoliosis    LABS:                        12.6   4.98  )-----------( 264      ( 15 Sep 2018 07:02 )             36.4     09-16    134<L>  |  95<L>  |  12  ----------------------------<  107<H>  3.7   |  26  |  0.39<L>    Ca    9.3      16 Sep 2018 06:58                  All consultant(s) notes reviewed and care discussed with other providers    Contact Number, Dr Hernandez [phone]
Patient is a 72y old  Female who presents with a chief complaint of weakness, abdominal pain, decreased appetite x 10 days (14 Sep 2018 14:24)    SUBJECTIVE / OVERNIGHT EVENTS: no overnight events    ROS:  Resp: No cough no sputum production  CVS: No chest pain no palpitations no orthopnea  GI: no N/V/D  : no dysuria, no hematuria  Neuro: no weakness no paresthesias  Heme: No petechiae no easy bruising  Msk: No joint pain no swelling  Skin: No rash no itching        MEDICATIONS  (STANDING):  enoxaparin Injectable 40 milliGRAM(s) SubCutaneous daily  influenza   Vaccine 0.5 milliLiter(s) IntraMuscular once  ketorolac   Injectable 15 milliGRAM(s) IV Push once  pantoprazole    Tablet 40 milliGRAM(s) Oral before breakfast    MEDICATIONS  (PRN):  oxyCODONE    5 mG/acetaminophen 325 mG 2 Tablet(s) Oral every 4 hours PRN Moderate Pain (4 - 6)  zolpidem 5 milliGRAM(s) Oral at bedtime PRN Insomnia  zolpidem 5 milliGRAM(s) Oral at bedtime PRN Insomnia        CAPILLARY BLOOD GLUCOSE        I&O's Summary    14 Sep 2018 07:01  -  15 Sep 2018 07:00  --------------------------------------------------------  IN: 520 mL / OUT: 400 mL / NET: 120 mL        Vital Signs Last 24 Hrs  T(C): 36.5 (15 Sep 2018 04:19), Max: 37.1 (14 Sep 2018 22:45)  T(F): 97.7 (15 Sep 2018 04:19), Max: 98.7 (14 Sep 2018 22:45)  HR: 76 (15 Sep 2018 04:19) (76 - 98)  BP: 135/81 (15 Sep 2018 04:19) (120/81 - 135/81)  BP(mean): --  RR: 17 (15 Sep 2018 04:19) (17 - 18)  SpO2: 96% (15 Sep 2018 12:05) (96% - 100%)     PHYSICAL EXAM: vital signs as above  in no apparent distress  HEENT: CINDY EOMI dry oral mucosa  Neck: Supple, no JVD, no thyromegaly  Lungs: no rhonchi, no wheeze, no crackles  CVS: S1 S2 soft ejection systolic murmur best heard at left sternal border, fletcher  Abdomen: no tenderness, no organomegaly, BS present   left paraumbilical chronic reducible hernia  Neuro: AO x 3 no focal weakness, no sensory abnormalities  Psych: appropriate affect  Skin: warm, dry  Ext: no cyanosis or clubbing, no edema  Msk: no joint swelling or deformities  Back: no CVA tenderness, no kyphosis/scoliosis    LABS:                        12.6   4.98  )-----------( 264      ( 15 Sep 2018 07:02 )             36.4     09-15    129<L>  |  91<L>  |  6<L>  ----------------------------<  105<H>  3.5   |  27  |  0.45<L>    Ca    9.7      15 Sep 2018 05:59  Mg     2.0     09-13                  All consultant(s) notes reviewed and care discussed with other providers    Contact Number, Dr Hernandez [phone]
Patient is a 72y old  Female who presents with a chief complaint of weakness, abdominal pain, decreased appetite x 10 days (14 Sep 2018 10:06)      SUBJECTIVE / OVERNIGHT EVENTS: no overnight events  feels much improved     ROS:  Resp: No cough no sputum production  CVS: No chest pain no palpitations no orthopnea  GI: no N/V/D  : no dysuria, no hematuria  Neuro: no weakness no paresthesias  Heme: No petechiae no easy bruising  Msk: No joint pain no swelling  Skin: No rash no itching        MEDICATIONS  (STANDING):  enoxaparin Injectable 40 milliGRAM(s) SubCutaneous daily  influenza   Vaccine 0.5 milliLiter(s) IntraMuscular once  pantoprazole    Tablet 40 milliGRAM(s) Oral before breakfast  potassium chloride    Tablet ER 20 milliEquivalent(s) Oral once    MEDICATIONS  (PRN):  oxyCODONE    5 mG/acetaminophen 325 mG 2 Tablet(s) Oral every 4 hours PRN Moderate Pain (4 - 6)  zolpidem 5 milliGRAM(s) Oral at bedtime PRN Insomnia  zolpidem 5 milliGRAM(s) Oral at bedtime PRN Insomnia        CAPILLARY BLOOD GLUCOSE        I&O's Summary    14 Sep 2018 07:01  -  14 Sep 2018 14:25  --------------------------------------------------------  IN: 400 mL / OUT: 200 mL / NET: 200 mL        Vital Signs Last 24 Hrs  T(C): 37.5 (14 Sep 2018 12:57), Max: 37.5 (14 Sep 2018 12:57)  T(F): 99.5 (14 Sep 2018 12:57), Max: 99.5 (14 Sep 2018 12:57)  HR: 98 (14 Sep 2018 13:49) (51 - 110)  BP: 131/77 (14 Sep 2018 12:57) (103/67 - 131/77)  BP(mean): --  RR: 16 (14 Sep 2018 12:57) (16 - 20)  SpO2: 95% (14 Sep 2018 12:57) (95% - 96%)     PHYSICAL EXAM: vital signs as above  in no apparent distress  HEENT: CINDY EOMI dry oral mucosa  Neck: Supple, no JVD, no thyromegaly  Lungs: no rhonchi, no wheeze, no crackles  CVS: S1 S2 soft ejection systolic murmur best heard at left sternal border, fletcher  Abdomen: no tenderness, no organomegaly, BS present   left paraumbilical chronic reducible hernia  Neuro: AO x 3 no focal weakness, no sensory abnormalities  Psych: appropriate affect  Skin: warm, dry  Ext: no cyanosis or clubbing, no edema  Msk: no joint swelling or deformities  Back: no CVA tenderness, no kyphosis/scoliosis    LABS:                        12.2   4.50  )-----------( 213      ( 14 Sep 2018 07:52 )             33.4     09-14    125<L>  |  88<L>  |  <4<L>  ----------------------------<  152<H>  3.6   |  26  |  0.45<L>    Ca    9.5      14 Sep 2018 09:56  Mg     2.0         TPro  7.5  /  Alb  4.7  /  TBili  0.7  /  DBili  x   /  AST  21  /  ALT  12  /  AlkPhos  52  09-13    PT/INR - ( 13 Sep 2018 10:31 )   PT: 11.9 sec;   INR: 1.10 ratio         PTT - ( 13 Sep 2018 10:31 )  PTT:30.4 sec      Urinalysis Basic - ( 13 Sep 2018 12:24 )    Color: Light Yellow / Appearance: Clear / S.010 / pH: x  Gluc: x / Ketone: Negative  / Bili: Negative / Urobili: Negative   Blood: x / Protein: Negative / Nitrite: Negative   Leuk Esterase: Negative / RBC: 2 /hpf / WBC 1 /hpf   Sq Epi: x / Non Sq Epi: 0 /hpf / Bacteria: Negative          All consultant(s) notes reviewed and care discussed with other providers    Contact Number, Dr Hernandez [phone]

## 2018-09-17 NOTE — PROGRESS NOTE ADULT - PROBLEM SELECTOR PLAN 3
resume BP meds except Lozol  HR reactive to deconditioning  will resume metoprolol
all meds on hold  will d/w renal in AM re plans to resume
off ACEI  hold amlodipine and Lozol and metoprolol for now  will resume on discharge per renal recommendations  discontinue Lozol
continue Fosinopril  hold amlodipine and Lozol and metoprolol for now  will resume on discharge

## 2018-09-17 NOTE — PROGRESS NOTE ADULT - PROVIDER SPECIALTY LIST ADULT
Internal Medicine
Internal Medicine
Nephrology
Internal Medicine
Internal Medicine

## 2018-09-17 NOTE — PROGRESS NOTE ADULT - PROBLEM SELECTOR PLAN 4
in remission  no evidence of recurrence on CT abd

## 2018-10-16 ENCOUNTER — APPOINTMENT (OUTPATIENT)
Dept: CARDIOLOGY | Facility: CLINIC | Age: 73
End: 2018-10-16
Payer: MEDICARE

## 2018-10-16 ENCOUNTER — NON-APPOINTMENT (OUTPATIENT)
Age: 73
End: 2018-10-16

## 2018-10-16 VITALS
HEIGHT: 60 IN | SYSTOLIC BLOOD PRESSURE: 146 MMHG | BODY MASS INDEX: 26.5 KG/M2 | HEART RATE: 58 BPM | DIASTOLIC BLOOD PRESSURE: 78 MMHG | WEIGHT: 135 LBS | OXYGEN SATURATION: 98 %

## 2018-10-16 DIAGNOSIS — Z86.79 PERSONAL HISTORY OF OTHER DISEASES OF THE CIRCULATORY SYSTEM: ICD-10-CM

## 2018-10-16 PROCEDURE — 93000 ELECTROCARDIOGRAM COMPLETE: CPT

## 2018-10-16 PROCEDURE — 99205 OFFICE O/P NEW HI 60 MIN: CPT

## 2019-01-15 ENCOUNTER — NON-APPOINTMENT (OUTPATIENT)
Age: 74
End: 2019-01-15

## 2019-01-15 ENCOUNTER — APPOINTMENT (OUTPATIENT)
Dept: CARDIOLOGY | Facility: CLINIC | Age: 74
End: 2019-01-15
Payer: MEDICARE

## 2019-01-15 VITALS
HEIGHT: 60 IN | WEIGHT: 140 LBS | OXYGEN SATURATION: 97 % | SYSTOLIC BLOOD PRESSURE: 161 MMHG | HEART RATE: 55 BPM | BODY MASS INDEX: 27.48 KG/M2 | DIASTOLIC BLOOD PRESSURE: 91 MMHG

## 2019-01-15 VITALS — WEIGHT: 135 LBS | BODY MASS INDEX: 26.37 KG/M2

## 2019-01-15 PROCEDURE — 99214 OFFICE O/P EST MOD 30 MIN: CPT

## 2019-01-15 PROCEDURE — 93000 ELECTROCARDIOGRAM COMPLETE: CPT

## 2019-01-15 RX ORDER — ISOSORBIDE MONONITRATE 30 MG/1
30 TABLET, EXTENDED RELEASE ORAL DAILY
Qty: 30 | Refills: 5 | Status: DISCONTINUED | COMMUNITY
Start: 2018-10-16 | End: 2019-01-15

## 2019-01-17 LAB
ALBUMIN SERPL ELPH-MCNC: 4.4 G/DL
ALP BLD-CCNC: 61 U/L
ALT SERPL-CCNC: 8 U/L
ANION GAP SERPL CALC-SCNC: 11 MMOL/L
AST SERPL-CCNC: 18 U/L
BASOPHILS # BLD AUTO: 0 K/UL
BASOPHILS NFR BLD AUTO: 0 %
BILIRUB SERPL-MCNC: 0.2 MG/DL
BUN SERPL-MCNC: 11 MG/DL
CALCIUM SERPL-MCNC: 9.6 MG/DL
CHLORIDE SERPL-SCNC: 104 MMOL/L
CHOLEST SERPL-MCNC: 206 MG/DL
CHOLEST/HDLC SERPL: 3.1 RATIO
CO2 SERPL-SCNC: 29 MMOL/L
CREAT SERPL-MCNC: 0.78 MG/DL
EOSINOPHIL # BLD AUTO: 0.05 K/UL
EOSINOPHIL NFR BLD AUTO: 0.7 %
GLUCOSE SERPL-MCNC: 95 MG/DL
HBA1C MFR BLD HPLC: 5.7 %
HCT VFR BLD CALC: 41.9 %
HDLC SERPL-MCNC: 66 MG/DL
HGB BLD-MCNC: 13.2 G/DL
IMM GRANULOCYTES NFR BLD AUTO: 0.3 %
LDLC SERPL CALC-MCNC: 95 MG/DL
LYMPHOCYTES # BLD AUTO: 2.08 K/UL
LYMPHOCYTES NFR BLD AUTO: 28.3 %
MAN DIFF?: NORMAL
MCHC RBC-ENTMCNC: 29.9 PG
MCHC RBC-ENTMCNC: 31.5 GM/DL
MCV RBC AUTO: 94.8 FL
MONOCYTES # BLD AUTO: 0.63 K/UL
MONOCYTES NFR BLD AUTO: 8.6 %
NEUTROPHILS # BLD AUTO: 4.56 K/UL
NEUTROPHILS NFR BLD AUTO: 62.1 %
NT-PROBNP SERPL-MCNC: 268 PG/ML
PLATELET # BLD AUTO: 283 K/UL
POTASSIUM SERPL-SCNC: 4.3 MMOL/L
PROT SERPL-MCNC: 7.5 G/DL
RBC # BLD: 4.42 M/UL
RBC # FLD: 12.6 %
SODIUM SERPL-SCNC: 144 MMOL/L
TRIGL SERPL-MCNC: 225 MG/DL
TSH SERPL-ACNC: 0.93 UIU/ML
WBC # FLD AUTO: 7.34 K/UL

## 2019-01-30 ENCOUNTER — MEDICATION RENEWAL (OUTPATIENT)
Age: 74
End: 2019-01-30

## 2019-02-12 ENCOUNTER — APPOINTMENT (OUTPATIENT)
Dept: CARDIOLOGY | Facility: CLINIC | Age: 74
End: 2019-02-12

## 2020-07-27 ENCOUNTER — OUTPATIENT (OUTPATIENT)
Dept: OUTPATIENT SERVICES | Facility: HOSPITAL | Age: 75
LOS: 1 days | End: 2020-07-27
Payer: COMMERCIAL

## 2020-07-27 ENCOUNTER — APPOINTMENT (OUTPATIENT)
Dept: CT IMAGING | Facility: IMAGING CENTER | Age: 75
End: 2020-07-27
Payer: MEDICARE

## 2020-07-27 DIAGNOSIS — E66.3 OVERWEIGHT: ICD-10-CM

## 2020-07-27 PROCEDURE — 82565 ASSAY OF CREATININE: CPT

## 2020-07-27 PROCEDURE — 74177 CT ABD & PELVIS W/CONTRAST: CPT | Mod: 26

## 2020-07-27 PROCEDURE — 74177 CT ABD & PELVIS W/CONTRAST: CPT

## 2021-05-25 ENCOUNTER — INPATIENT (INPATIENT)
Facility: HOSPITAL | Age: 76
LOS: 1 days | Discharge: HOME | DRG: 313 | End: 2021-05-27
Attending: INTERNAL MEDICINE | Admitting: INTERNAL MEDICINE
Payer: COMMERCIAL

## 2021-05-25 VITALS
SYSTOLIC BLOOD PRESSURE: 178 MMHG | DIASTOLIC BLOOD PRESSURE: 95 MMHG | RESPIRATION RATE: 18 BRPM | HEIGHT: 60 IN | TEMPERATURE: 99 F | HEART RATE: 73 BPM | WEIGHT: 139.99 LBS

## 2021-05-25 DIAGNOSIS — R07.9 CHEST PAIN, UNSPECIFIED: ICD-10-CM

## 2021-05-25 DIAGNOSIS — I10 ESSENTIAL (PRIMARY) HYPERTENSION: ICD-10-CM

## 2021-05-25 DIAGNOSIS — N39.0 URINARY TRACT INFECTION, SITE NOT SPECIFIED: ICD-10-CM

## 2021-05-25 DIAGNOSIS — Z29.9 ENCOUNTER FOR PROPHYLACTIC MEASURES, UNSPECIFIED: ICD-10-CM

## 2021-05-25 DIAGNOSIS — M54.9 DORSALGIA, UNSPECIFIED: ICD-10-CM

## 2021-05-25 LAB
ALBUMIN SERPL ELPH-MCNC: 4.4 G/DL — SIGNIFICANT CHANGE UP (ref 3.3–5)
ALP SERPL-CCNC: 73 U/L — SIGNIFICANT CHANGE UP (ref 40–120)
ALT FLD-CCNC: 16 U/L — SIGNIFICANT CHANGE UP (ref 10–45)
ANION GAP SERPL CALC-SCNC: 14 MMOL/L — SIGNIFICANT CHANGE UP (ref 5–17)
APPEARANCE UR: ABNORMAL
APTT BLD: 28.3 SEC — SIGNIFICANT CHANGE UP (ref 27.5–35.5)
AST SERPL-CCNC: 21 U/L — SIGNIFICANT CHANGE UP (ref 10–40)
BACTERIA # UR AUTO: NEGATIVE — SIGNIFICANT CHANGE UP
BASOPHILS # BLD AUTO: 0.01 K/UL — SIGNIFICANT CHANGE UP (ref 0–0.2)
BASOPHILS NFR BLD AUTO: 0.1 % — SIGNIFICANT CHANGE UP (ref 0–2)
BILIRUB SERPL-MCNC: 0.4 MG/DL — SIGNIFICANT CHANGE UP (ref 0.2–1.2)
BILIRUB UR-MCNC: ABNORMAL
BUN SERPL-MCNC: 13 MG/DL — SIGNIFICANT CHANGE UP (ref 7–23)
CALCIUM SERPL-MCNC: 9.8 MG/DL — SIGNIFICANT CHANGE UP (ref 8.4–10.5)
CHLORIDE SERPL-SCNC: 102 MMOL/L — SIGNIFICANT CHANGE UP (ref 96–108)
CO2 SERPL-SCNC: 23 MMOL/L — SIGNIFICANT CHANGE UP (ref 22–31)
COLOR SPEC: ABNORMAL
CREAT SERPL-MCNC: 0.56 MG/DL — SIGNIFICANT CHANGE UP (ref 0.5–1.3)
D DIMER BLD IA.RAPID-MCNC: 319 NG/ML DDU — HIGH
DIFF PNL FLD: NEGATIVE — SIGNIFICANT CHANGE UP
EOSINOPHIL # BLD AUTO: 0.05 K/UL — SIGNIFICANT CHANGE UP (ref 0–0.5)
EOSINOPHIL NFR BLD AUTO: 0.4 % — SIGNIFICANT CHANGE UP (ref 0–6)
EPI CELLS # UR: 0 /HPF — SIGNIFICANT CHANGE UP
GLUCOSE BLDC GLUCOMTR-MCNC: 122 MG/DL — HIGH (ref 70–99)
GLUCOSE SERPL-MCNC: 148 MG/DL — HIGH (ref 70–99)
GLUCOSE UR QL: NEGATIVE — SIGNIFICANT CHANGE UP
HCT VFR BLD CALC: 42.1 % — SIGNIFICANT CHANGE UP (ref 34.5–45)
HGB BLD-MCNC: 13.4 G/DL — SIGNIFICANT CHANGE UP (ref 11.5–15.5)
HYALINE CASTS # UR AUTO: 0 /LPF — SIGNIFICANT CHANGE UP (ref 0–2)
IMM GRANULOCYTES NFR BLD AUTO: 0.3 % — SIGNIFICANT CHANGE UP (ref 0–1.5)
INR BLD: 1.01 RATIO — SIGNIFICANT CHANGE UP (ref 0.88–1.16)
KETONES UR-MCNC: NEGATIVE — SIGNIFICANT CHANGE UP
LEUKOCYTE ESTERASE UR-ACNC: ABNORMAL
LYMPHOCYTES # BLD AUTO: 1.61 K/UL — SIGNIFICANT CHANGE UP (ref 1–3.3)
LYMPHOCYTES # BLD AUTO: 11.5 % — LOW (ref 13–44)
MCHC RBC-ENTMCNC: 30 PG — SIGNIFICANT CHANGE UP (ref 27–34)
MCHC RBC-ENTMCNC: 31.8 GM/DL — LOW (ref 32–36)
MCV RBC AUTO: 94.2 FL — SIGNIFICANT CHANGE UP (ref 80–100)
MONOCYTES # BLD AUTO: 0.87 K/UL — SIGNIFICANT CHANGE UP (ref 0–0.9)
MONOCYTES NFR BLD AUTO: 6.2 % — SIGNIFICANT CHANGE UP (ref 2–14)
NEUTROPHILS # BLD AUTO: 11.37 K/UL — HIGH (ref 1.8–7.4)
NEUTROPHILS NFR BLD AUTO: 81.5 % — HIGH (ref 43–77)
NITRITE UR-MCNC: POSITIVE
NRBC # BLD: 0 /100 WBCS — SIGNIFICANT CHANGE UP (ref 0–0)
NT-PROBNP SERPL-SCNC: 104 PG/ML — SIGNIFICANT CHANGE UP (ref 0–300)
PH UR: 6.5 — SIGNIFICANT CHANGE UP (ref 5–8)
PLATELET # BLD AUTO: 293 K/UL — SIGNIFICANT CHANGE UP (ref 150–400)
POTASSIUM SERPL-MCNC: 4.1 MMOL/L — SIGNIFICANT CHANGE UP (ref 3.5–5.3)
POTASSIUM SERPL-SCNC: 4.1 MMOL/L — SIGNIFICANT CHANGE UP (ref 3.5–5.3)
PROT SERPL-MCNC: 7.6 G/DL — SIGNIFICANT CHANGE UP (ref 6–8.3)
PROT UR-MCNC: ABNORMAL
PROTHROM AB SERPL-ACNC: 12.1 SEC — SIGNIFICANT CHANGE UP (ref 10.6–13.6)
RBC # BLD: 4.47 M/UL — SIGNIFICANT CHANGE UP (ref 3.8–5.2)
RBC # FLD: 12.6 % — SIGNIFICANT CHANGE UP (ref 10.3–14.5)
RBC CASTS # UR COMP ASSIST: 10 /HPF — HIGH (ref 0–4)
SARS-COV-2 RNA SPEC QL NAA+PROBE: SIGNIFICANT CHANGE UP
SODIUM SERPL-SCNC: 139 MMOL/L — SIGNIFICANT CHANGE UP (ref 135–145)
SP GR SPEC: 1.01 — SIGNIFICANT CHANGE UP (ref 1.01–1.02)
TROPONIN T, HIGH SENSITIVITY RESULT: <6 NG/L — SIGNIFICANT CHANGE UP (ref 0–51)
TROPONIN T, HIGH SENSITIVITY RESULT: <6 NG/L — SIGNIFICANT CHANGE UP (ref 0–51)
UROBILINOGEN FLD QL: ABNORMAL
WBC # BLD: 13.95 K/UL — HIGH (ref 3.8–10.5)
WBC # FLD AUTO: 13.95 K/UL — HIGH (ref 3.8–10.5)
WBC UR QL: 69 /HPF — HIGH (ref 0–5)

## 2021-05-25 PROCEDURE — 71275 CT ANGIOGRAPHY CHEST: CPT | Mod: 26,MG

## 2021-05-25 PROCEDURE — 71045 X-RAY EXAM CHEST 1 VIEW: CPT | Mod: 26

## 2021-05-25 PROCEDURE — 99223 1ST HOSP IP/OBS HIGH 75: CPT

## 2021-05-25 PROCEDURE — 99285 EMERGENCY DEPT VISIT HI MDM: CPT

## 2021-05-25 PROCEDURE — G1004: CPT

## 2021-05-25 RX ORDER — LIDOCAINE 4 G/100G
1 CREAM TOPICAL ONCE
Refills: 0 | Status: DISCONTINUED | OUTPATIENT
Start: 2021-05-25 | End: 2021-05-27

## 2021-05-25 RX ORDER — ZOLPIDEM TARTRATE 10 MG/1
5 TABLET ORAL AT BEDTIME
Refills: 0 | Status: DISCONTINUED | OUTPATIENT
Start: 2021-05-25 | End: 2021-05-27

## 2021-05-25 RX ORDER — METOPROLOL TARTRATE 50 MG
1 TABLET ORAL
Qty: 0 | Refills: 0 | DISCHARGE

## 2021-05-25 RX ORDER — CEFTRIAXONE 500 MG/1
1000 INJECTION, POWDER, FOR SOLUTION INTRAMUSCULAR; INTRAVENOUS ONCE
Refills: 0 | Status: COMPLETED | OUTPATIENT
Start: 2021-05-25 | End: 2021-05-25

## 2021-05-25 RX ORDER — LISINOPRIL 2.5 MG/1
20 TABLET ORAL DAILY
Refills: 0 | Status: DISCONTINUED | OUTPATIENT
Start: 2021-05-25 | End: 2021-05-27

## 2021-05-25 RX ORDER — PANTOPRAZOLE SODIUM 20 MG/1
1 TABLET, DELAYED RELEASE ORAL
Qty: 0 | Refills: 0 | DISCHARGE

## 2021-05-25 RX ORDER — ZOLPIDEM TARTRATE 10 MG/1
1 TABLET ORAL
Qty: 0 | Refills: 0 | DISCHARGE

## 2021-05-25 RX ORDER — SENNA PLUS 8.6 MG/1
2 TABLET ORAL AT BEDTIME
Refills: 0 | Status: DISCONTINUED | OUTPATIENT
Start: 2021-05-25 | End: 2021-05-27

## 2021-05-25 RX ORDER — OXYCODONE HYDROCHLORIDE 5 MG/1
5 TABLET ORAL EVERY 6 HOURS
Refills: 0 | Status: DISCONTINUED | OUTPATIENT
Start: 2021-05-25 | End: 2021-05-27

## 2021-05-25 RX ORDER — CEFTRIAXONE 500 MG/1
1000 INJECTION, POWDER, FOR SOLUTION INTRAMUSCULAR; INTRAVENOUS EVERY 24 HOURS
Refills: 0 | Status: COMPLETED | OUTPATIENT
Start: 2021-05-26 | End: 2021-05-27

## 2021-05-25 RX ORDER — DOCUSATE SODIUM 100 MG
1 CAPSULE ORAL
Qty: 0 | Refills: 0 | DISCHARGE

## 2021-05-25 RX ORDER — LISINOPRIL 2.5 MG/1
10 TABLET ORAL ONCE
Refills: 0 | Status: COMPLETED | OUTPATIENT
Start: 2021-05-25 | End: 2021-05-25

## 2021-05-25 RX ORDER — MORPHINE SULFATE 50 MG/1
4 CAPSULE, EXTENDED RELEASE ORAL ONCE
Refills: 0 | Status: DISCONTINUED | OUTPATIENT
Start: 2021-05-25 | End: 2021-05-25

## 2021-05-25 RX ORDER — AMLODIPINE BESYLATE 2.5 MG/1
5 TABLET ORAL DAILY
Refills: 0 | Status: DISCONTINUED | OUTPATIENT
Start: 2021-05-25 | End: 2021-05-26

## 2021-05-25 RX ORDER — PANTOPRAZOLE SODIUM 20 MG/1
40 TABLET, DELAYED RELEASE ORAL
Refills: 0 | Status: DISCONTINUED | OUTPATIENT
Start: 2021-05-25 | End: 2021-05-27

## 2021-05-25 RX ORDER — ENOXAPARIN SODIUM 100 MG/ML
40 INJECTION SUBCUTANEOUS DAILY
Refills: 0 | Status: DISCONTINUED | OUTPATIENT
Start: 2021-05-25 | End: 2021-05-27

## 2021-05-25 RX ORDER — ASPIRIN/CALCIUM CARB/MAGNESIUM 324 MG
324 TABLET ORAL ONCE
Refills: 0 | Status: COMPLETED | OUTPATIENT
Start: 2021-05-25 | End: 2021-05-25

## 2021-05-25 RX ORDER — POTASSIUM CHLORIDE 20 MEQ
1 PACKET (EA) ORAL
Qty: 0 | Refills: 0 | DISCHARGE

## 2021-05-25 RX ORDER — AMLODIPINE BESYLATE 2.5 MG/1
1 TABLET ORAL
Qty: 0 | Refills: 0 | DISCHARGE

## 2021-05-25 RX ORDER — FOSINOPRIL SODIUM 10 MG/1
1 TABLET ORAL
Qty: 0 | Refills: 0 | DISCHARGE

## 2021-05-25 RX ORDER — POLYETHYLENE GLYCOL 3350 17 G/17G
17 POWDER, FOR SOLUTION ORAL
Qty: 0 | Refills: 0 | DISCHARGE

## 2021-05-25 RX ORDER — METOPROLOL TARTRATE 50 MG
100 TABLET ORAL ONCE
Refills: 0 | Status: COMPLETED | OUTPATIENT
Start: 2021-05-25 | End: 2021-05-25

## 2021-05-25 RX ORDER — METOPROLOL TARTRATE 50 MG
100 TABLET ORAL
Refills: 0 | Status: DISCONTINUED | OUTPATIENT
Start: 2021-05-25 | End: 2021-05-27

## 2021-05-25 RX ORDER — POLYETHYLENE GLYCOL 3350 17 G/17G
17 POWDER, FOR SOLUTION ORAL DAILY
Refills: 0 | Status: DISCONTINUED | OUTPATIENT
Start: 2021-05-25 | End: 2021-05-27

## 2021-05-25 RX ORDER — ERGOCALCIFEROL 1.25 MG/1
1 CAPSULE ORAL
Qty: 0 | Refills: 0 | DISCHARGE

## 2021-05-25 RX ORDER — ACETAMINOPHEN 500 MG
650 TABLET ORAL EVERY 4 HOURS
Refills: 0 | Status: DISCONTINUED | OUTPATIENT
Start: 2021-05-25 | End: 2021-05-27

## 2021-05-25 RX ORDER — FAMOTIDINE 10 MG/ML
20 INJECTION INTRAVENOUS ONCE
Refills: 0 | Status: COMPLETED | OUTPATIENT
Start: 2021-05-25 | End: 2021-05-25

## 2021-05-25 RX ORDER — ACETAMINOPHEN 500 MG
650 TABLET ORAL ONCE
Refills: 0 | Status: COMPLETED | OUTPATIENT
Start: 2021-05-25 | End: 2021-05-25

## 2021-05-25 RX ADMIN — Medication 650 MILLIGRAM(S): at 13:52

## 2021-05-25 RX ADMIN — ZOLPIDEM TARTRATE 5 MILLIGRAM(S): 10 TABLET ORAL at 23:01

## 2021-05-25 RX ADMIN — LISINOPRIL 10 MILLIGRAM(S): 2.5 TABLET ORAL at 13:36

## 2021-05-25 RX ADMIN — Medication 100 MILLIGRAM(S): at 13:36

## 2021-05-25 RX ADMIN — FAMOTIDINE 20 MILLIGRAM(S): 10 INJECTION INTRAVENOUS at 17:54

## 2021-05-25 RX ADMIN — LISINOPRIL 20 MILLIGRAM(S): 2.5 TABLET ORAL at 22:11

## 2021-05-25 RX ADMIN — Medication 100 MILLIGRAM(S): at 22:11

## 2021-05-25 RX ADMIN — MORPHINE SULFATE 4 MILLIGRAM(S): 50 CAPSULE, EXTENDED RELEASE ORAL at 11:55

## 2021-05-25 RX ADMIN — Medication 30 MILLILITER(S): at 17:54

## 2021-05-25 RX ADMIN — Medication 324 MILLIGRAM(S): at 11:43

## 2021-05-25 RX ADMIN — CEFTRIAXONE 100 MILLIGRAM(S): 500 INJECTION, POWDER, FOR SOLUTION INTRAMUSCULAR; INTRAVENOUS at 13:40

## 2021-05-25 NOTE — H&P ADULT - HISTORY OF PRESENT ILLNESS
Patient is a 75 year old  female w/PMhx HTN, GERD presents chest pain for the past two days.   Patient is a 75 year old  female w/PMhx HTN, GERD , scoliosis s/p multiple back surgeries, presents chest pain for the past two days.  Patient reports pain is 8/10 , difficult to describe quality, located in the mid-sternum  with radiation to right chest and upper back ,  occurring intermittently,   exacerbated with deep breathing and coughingas well a laying flat , not worsened with exertion, no  relieving factors , no associated shortness of breath , no palpitations, she has ongoing  lower extremity edema which is unchanged , no nausea or vomiting , symptoms are  progressively worsening  over the past two days.  Patient also reports ongoing social stress for the past few weeks , during this time she noticed decrease in exercise tolerance , no orthopnea and no PND. Patient reports no fever or chills , no cough . No recent travel or prolonged immobilization.

## 2021-05-25 NOTE — H&P ADULT - ASSESSMENT
75 F w/PMhx HTN, GERD , scoliosis s/p multiple back surgeries, presents chest pain for the past two days.

## 2021-05-25 NOTE — ED ADULT NURSE NOTE - NSIMPLEMENTINTERV_GEN_ALL_ED
Implemented All Universal Safety Interventions:  [place] to call system. Call bell, personal items and telephone within reach. Instruct patient to call for assistance. Room bathroom lighting operational. Non-slip footwear when patient is off stretcher. Physically safe environment: no spills, clutter or unnecessary equipment. Stretcher in lowest position, wheels locked, appropriate side rails in place.

## 2021-05-25 NOTE — H&P ADULT - PROBLEM SELECTOR PLAN 1
trop wnl x 2 , ekg w/nonspecific twave changes , CTA neg for PE . ongoing social stress and decrease in ET is concerning , will require further cardiac w/u  - telemetry   - transthoracic echocardiogram   - Cardiology consult to be followed up by day team trop wnl x 2 , ekg w/nonspecific twave changes , CTA neg for PE .  decrease in ET in setting of social stess is concerning for underlying cardiac disease, will require further w/u  - telemetry   - transthoracic echocardiogram   - Cardiology consult to be followed up by day team trop wnl x 2 , ekg w/nonspecific twave changes , CTA neg for PE .  decrease in ET in setting of social stess is concerning for underlying cardiac disease, will require further w/u  - telemetry   - transthoracic echocardiogram   - Cardiology consult to be followed up by day team  - A1c / lipid profile

## 2021-05-25 NOTE — ED ADULT NURSE REASSESSMENT NOTE - NS ED NURSE REASSESS COMMENT FT1
Received patient from JENNY Lebron, patient at baseline mental status, able to make needs known, NAD, VSS, patient agreeable to plan of care, pending admission bed, comfort and safety provided.
pt medicated with home dose of metoprolol and lisinopril, PT HR 63 when medicated MD and PA okay with administration as it is pts home medication. Pt given tylenol for return of right chest pain.  at the bedside. pt in nad, and has a pleasant demeanor.
pt states pain is now a 2/10

## 2021-05-25 NOTE — ED PROVIDER NOTE - PROGRESS NOTE DETAILS
Pt endorsed to Dr. Jose Miguel Hernandez for admission. Requesting house cards, cards fellow Dr. Gamble made aware of consult. - Alo Mcgee PA-C

## 2021-05-25 NOTE — H&P ADULT - NSHPSOCIALHISTORY_GEN_ALL_CORE
Social History:    Marital Status:  ( x  )    (   ) Single    (   )    (  )   Occupation: retired  Lives with: (  ) alone  (  ) children   ( x ) spouse   (  ) parents  (  ) other    Substance Use (street drugs): ( x ) never used  (  ) other:  Tobacco Usage:  (   ) never smoked   (  x ) former smoker, quit at age 25    (   ) current smoker  (     ) pack year  (        ) last cigarette date  Alcohol Usage: + 2-3 glasses of wine a few times weekly , no h/o withdrawal

## 2021-05-25 NOTE — H&P ADULT - NSHPLABSRESULTS_GEN_ALL_CORE
Labs personally reviewed:                          13.4   13.95 )-----------( 293      ( 25 May 2021 11:45 )             42.1         139  |  102  |  13  ----------------------------<  148<H>  4.1   |  23  |  0.56    Ca    9.8      25 May 2021 11:45    TPro  7.6  /  Alb  4.4  /  TBili  0.4  /  DBili  x   /  AST  21  /  ALT  16  /  AlkPhos  73          LIVER FUNCTIONS - ( 25 May 2021 11:45 )  Alb: 4.4 g/dL / Pro: 7.6 g/dL / ALK PHOS: 73 U/L / ALT: 16 U/L / AST: 21 U/L / GGT: x           PT/INR - ( 25 May 2021 12:03 )   PT: 12.1 sec;   INR: 1.01 ratio         PTT - ( 25 May 2021 12:03 )  PTT:28.3 sec  Urinalysis Basic - ( 25 May 2021 13:03 )    Color: Dark Orange / Appearance: Slightly Turbid / S.014 / pH: x  Gluc: x / Ketone: Negative  / Bili: Moderate / Urobili: 4 mg/dL   Blood: x / Protein: Trace / Nitrite: Positive   Leuk Esterase: Large / RBC: 10 /hpf / WBC 69 /HPF   Sq Epi: x / Non Sq Epi: 0 /hpf / Bacteria: Negative      CAPILLARY BLOOD GLUCOSE          Imaging:  CXR personally reviewed:     EKG personally reviewed: Labs personally reviewed:                          13.4   13.95 )-----------( 293      ( 25 May 2021 11:45 )             42.1         139  |  102  |  13  ----------------------------<  148<H>  4.1   |  23  |  0.56    Ca    9.8      25 May 2021 11:45    TPro  7.6  /  Alb  4.4  /  TBili  0.4  /  DBili  x   /  AST  21  /  ALT  16  /  AlkPhos  73          LIVER FUNCTIONS - ( 25 May 2021 11:45 )  Alb: 4.4 g/dL / Pro: 7.6 g/dL / ALK PHOS: 73 U/L / ALT: 16 U/L / AST: 21 U/L / GGT: x           PT/INR - ( 25 May 2021 12:03 )   PT: 12.1 sec;   INR: 1.01 ratio         PTT - ( 25 May 2021 12:03 )  PTT:28.3 sec  Urinalysis Basic - ( 25 May 2021 13:03 )    Color: Dark Orange / Appearance: Slightly Turbid / S.014 / pH: x  Gluc: x / Ketone: Negative  / Bili: Moderate / Urobili: 4 mg/dL   Blood: x / Protein: Trace / Nitrite: Positive   Leuk Esterase: Large / RBC: 10 /hpf / WBC 69 /HPF   Sq Epi: x / Non Sq Epi: 0 /hpf / Bacteria: Negative      CAPILLARY BLOOD GLUCOSE          Imaging:  CXR personally reviewed: no focal infiltrates   CTA chest:  No pulmonary embolism.    EKG personally reviewed:

## 2021-05-25 NOTE — H&P ADULT - NSICDXPASTMEDICALHX_GEN_ALL_CORE_FT
PAST MEDICAL HISTORY:  Age Related Osteoporosis     History of Scoliosis with dietrich's mateo placement about 13 years ago    HTN (Hypertension)     Malignant Neoplasm of Corpus Uteri

## 2021-05-25 NOTE — H&P ADULT - NSICDXPASTSURGICALHX_GEN_ALL_CORE_FT
PAST SURGICAL HISTORY:  History of Hemorrhoidectomy     History of Tonsillectomy     S/P Appendectomy     S/P Cervical Polypectomy 2007    S/P  Section  &     s/p left shoulder surgery     S/P Wrist Surgery right side    Scoliosis of Thoracic Spine s/p dietrich's mateo placement 13 years ago

## 2021-05-25 NOTE — H&P ADULT - PROBLEM SELECTOR PLAN 4
- lmwh for dvt ppx + UA , leukocytosis  s/p ceftriaxone x 1   - continue ceftriaxone   - f/u urine cultures

## 2021-05-25 NOTE — ED PROVIDER NOTE - CLINICAL SUMMARY MEDICAL DECISION MAKING FREE TEXT BOX
BERTHA Curtis MD: Pt is a 74 y/o female with PMhx HTN, GERD, chronic back pain p/w c/o midsternal CP that radiates to R chest x 2 days. States worse with inspiration. BERTHA Curtis MD: Pt is a 74 y/o female with PMhx HTN, GERD, chronic back pain p/w c/o midsternal CP that radiates to R chest x 2 days. States worse with inspiration. Denies recent travel/trauma/immobilization. Last stress test many yrs ago. Ddx includes, however, is not limited to: ACS, PE, PNA, viral syndrome, other. Plan: basic labs, cxr, 12-lead, trop, d-dimer, pain control, TBA for further cardiac w/u

## 2021-05-25 NOTE — ED ADULT NURSE NOTE - OBJECTIVE STATEMENT
75y female with hx of htn presents to the ER c/o chest pain. pt is alert and oriented x 4 and speaking coherently. pt states that she started to have midsternal chest pressure two days ago intermittently. pt states the pain is now constant and has moved to her right chest wall, this am she states the pain started to radiate to her back, pt currently denies any radiating pain to the back. pt denies sob, but states "I am aware of my breathing" respirations non labored, O2 saturation WNL. Pt denies nausea, vomiting, fevers, chills. +urinary symptoms, taking AZO starting yesterday. MD yen completed. will reassess.

## 2021-05-25 NOTE — ED PROVIDER NOTE - OBJECTIVE STATEMENT
74 yo female PMhx HTN, GERD presents to the ED c/o midsternal cp w/ radiation to R side of chest x 2 days. Pt states at first pain was intermittent, no obvious alleviating or provoking factors, today felt pain changes, became more severe, now felt on R side of chest and R upper back, worsened with deep inspiration and cough (states does not have a cough but notices if she does cough its worse. Endorsing chronic LE swelling, no worse since cp onset. Chest pain not associated w/ exertion. Has not taken anything for pain. Denies shortness of breath, calf pain, dyspnea on exertion, numbness/tingling, abd pain, n/v/d, fever/chills, cough, recent travel, hx dvt/pe, recent sick contacts. Cannot recall if she's ever had a stress test, rash.   Cardiologist Dr: Valerio Church

## 2021-05-25 NOTE — H&P ADULT - NSHPPHYSICALEXAM_GEN_ALL_CORE
Vital Signs Last 24 Hrs  T(C): 36.9 (25 May 2021 18:02), Max: 37.2 (25 May 2021 16:13)  T(F): 98.4 (25 May 2021 18:02), Max: 98.9 (25 May 2021 16:13)  HR: 78 (25 May 2021 19:18) (56 - 78)  BP: 167/91 (25 May 2021 19:18) (154/77 - 186/91)  BP(mean): --  RR: 18 (25 May 2021 18:02) (16 - 18)  SpO2: 97% (25 May 2021 18:02) (94% - 100%) Vital Signs Last 24 Hrs  T(C): 36.9 (25 May 2021 18:02), Max: 37.2 (25 May 2021 16:13)  T(F): 98.4 (25 May 2021 18:02), Max: 98.9 (25 May 2021 16:13)  HR: 78 (25 May 2021 19:18) (56 - 78)  BP: 167/91 (25 May 2021 19:18) (154/77 - 186/91)  BP(mean): --  RR: 18 (25 May 2021 18:02) (16 - 18)  SpO2: 97% (25 May 2021 18:02) (94% - 100%)    GENERAL: No acute distress, well-developed  HEAD:  Atraumatic, Normocephalic  ENT: EOMI, PERRLA, conjunctiva and sclera clear,  moist mucosa no pharyngeal erythema or exudates   NECK: supple , no JVD   CHEST/LUNG: Clear to auscultation bilaterally; No wheeze, equal breath sounds bilaterally   BACK: healed scare along length of spine, No spinal tenderness,  No CVA tenderness   HEART: Regular rate and rhythm; No murmurs, rubs, or gallops  ABDOMEN: Soft, Nontender, Nondistended; Bowel sounds present  EXTREMITIES:  No clubbing, cyanosis, +1 pitting  edema b/l   MSK: No joint swelling or effusions, ROM intact   PSYCH: Normal behavior/affect  NEUROLOGY: AAOx3, non-focal, cranial nerves intact  SKIN: Normal color, No rashes or lesions

## 2021-05-26 ENCOUNTER — TRANSCRIPTION ENCOUNTER (OUTPATIENT)
Age: 76
End: 2021-05-26

## 2021-05-26 LAB
A1C WITH ESTIMATED AVERAGE GLUCOSE RESULT: 5.7 % — HIGH (ref 4–5.6)
ALBUMIN SERPL ELPH-MCNC: 4.6 G/DL — SIGNIFICANT CHANGE UP (ref 3.3–5)
ALP SERPL-CCNC: 83 U/L — SIGNIFICANT CHANGE UP (ref 40–120)
ALT FLD-CCNC: 14 U/L — SIGNIFICANT CHANGE UP (ref 10–45)
ANION GAP SERPL CALC-SCNC: 15 MMOL/L — SIGNIFICANT CHANGE UP (ref 5–17)
AST SERPL-CCNC: 17 U/L — SIGNIFICANT CHANGE UP (ref 10–40)
BASOPHILS # BLD AUTO: 0 K/UL — SIGNIFICANT CHANGE UP (ref 0–0.2)
BASOPHILS NFR BLD AUTO: 0 % — SIGNIFICANT CHANGE UP (ref 0–2)
BILIRUB SERPL-MCNC: 0.8 MG/DL — SIGNIFICANT CHANGE UP (ref 0.2–1.2)
BUN SERPL-MCNC: 8 MG/DL — SIGNIFICANT CHANGE UP (ref 7–23)
CALCIUM SERPL-MCNC: 10.1 MG/DL — SIGNIFICANT CHANGE UP (ref 8.4–10.5)
CHLORIDE SERPL-SCNC: 101 MMOL/L — SIGNIFICANT CHANGE UP (ref 96–108)
CHOLEST SERPL-MCNC: 188 MG/DL — SIGNIFICANT CHANGE UP
CO2 SERPL-SCNC: 24 MMOL/L — SIGNIFICANT CHANGE UP (ref 22–31)
COVID-19 SPIKE DOMAIN AB INTERP: POSITIVE
COVID-19 SPIKE DOMAIN ANTIBODY RESULT: >250 U/ML — HIGH
CREAT SERPL-MCNC: 0.56 MG/DL — SIGNIFICANT CHANGE UP (ref 0.5–1.3)
EOSINOPHIL # BLD AUTO: 0.05 K/UL — SIGNIFICANT CHANGE UP (ref 0–0.5)
EOSINOPHIL NFR BLD AUTO: 0.6 % — SIGNIFICANT CHANGE UP (ref 0–6)
ESTIMATED AVERAGE GLUCOSE: 117 MG/DL — HIGH (ref 68–114)
GLUCOSE SERPL-MCNC: 129 MG/DL — HIGH (ref 70–99)
HCT VFR BLD CALC: 43.8 % — SIGNIFICANT CHANGE UP (ref 34.5–45)
HDLC SERPL-MCNC: 66 MG/DL — SIGNIFICANT CHANGE UP
HGB BLD-MCNC: 14.3 G/DL — SIGNIFICANT CHANGE UP (ref 11.5–15.5)
IMM GRANULOCYTES NFR BLD AUTO: 0.4 % — SIGNIFICANT CHANGE UP (ref 0–1.5)
LIPID PNL WITH DIRECT LDL SERPL: 95 MG/DL — SIGNIFICANT CHANGE UP
LYMPHOCYTES # BLD AUTO: 1.25 K/UL — SIGNIFICANT CHANGE UP (ref 1–3.3)
LYMPHOCYTES # BLD AUTO: 14 % — SIGNIFICANT CHANGE UP (ref 13–44)
MCHC RBC-ENTMCNC: 30.4 PG — SIGNIFICANT CHANGE UP (ref 27–34)
MCHC RBC-ENTMCNC: 32.6 GM/DL — SIGNIFICANT CHANGE UP (ref 32–36)
MCV RBC AUTO: 93.2 FL — SIGNIFICANT CHANGE UP (ref 80–100)
MONOCYTES # BLD AUTO: 0.64 K/UL — SIGNIFICANT CHANGE UP (ref 0–0.9)
MONOCYTES NFR BLD AUTO: 7.2 % — SIGNIFICANT CHANGE UP (ref 2–14)
NEUTROPHILS # BLD AUTO: 6.96 K/UL — SIGNIFICANT CHANGE UP (ref 1.8–7.4)
NEUTROPHILS NFR BLD AUTO: 77.8 % — HIGH (ref 43–77)
NON HDL CHOLESTEROL: 122 MG/DL — SIGNIFICANT CHANGE UP
NRBC # BLD: 0 /100 WBCS — SIGNIFICANT CHANGE UP (ref 0–0)
PLATELET # BLD AUTO: 274 K/UL — SIGNIFICANT CHANGE UP (ref 150–400)
POTASSIUM SERPL-MCNC: 3.6 MMOL/L — SIGNIFICANT CHANGE UP (ref 3.5–5.3)
POTASSIUM SERPL-SCNC: 3.6 MMOL/L — SIGNIFICANT CHANGE UP (ref 3.5–5.3)
PROT SERPL-MCNC: 8 G/DL — SIGNIFICANT CHANGE UP (ref 6–8.3)
RBC # BLD: 4.7 M/UL — SIGNIFICANT CHANGE UP (ref 3.8–5.2)
RBC # FLD: 12.5 % — SIGNIFICANT CHANGE UP (ref 10.3–14.5)
SARS-COV-2 IGG+IGM SERPL QL IA: >250 U/ML — HIGH
SARS-COV-2 IGG+IGM SERPL QL IA: POSITIVE
SODIUM SERPL-SCNC: 140 MMOL/L — SIGNIFICANT CHANGE UP (ref 135–145)
TRIGL SERPL-MCNC: 133 MG/DL — SIGNIFICANT CHANGE UP
WBC # BLD: 8.94 K/UL — SIGNIFICANT CHANGE UP (ref 3.8–10.5)
WBC # FLD AUTO: 8.94 K/UL — SIGNIFICANT CHANGE UP (ref 3.8–10.5)

## 2021-05-26 PROCEDURE — 99221 1ST HOSP IP/OBS SF/LOW 40: CPT

## 2021-05-26 PROCEDURE — 93010 ELECTROCARDIOGRAM REPORT: CPT

## 2021-05-26 PROCEDURE — 93306 TTE W/DOPPLER COMPLETE: CPT | Mod: 26

## 2021-05-26 RX ORDER — AMLODIPINE BESYLATE 2.5 MG/1
10 TABLET ORAL DAILY
Refills: 0 | Status: DISCONTINUED | OUTPATIENT
Start: 2021-05-26 | End: 2021-05-27

## 2021-05-26 RX ORDER — KETOROLAC TROMETHAMINE 30 MG/ML
15 SYRINGE (ML) INJECTION ONCE
Refills: 0 | Status: DISCONTINUED | OUTPATIENT
Start: 2021-05-26 | End: 2021-05-26

## 2021-05-26 RX ORDER — OXYCODONE HYDROCHLORIDE 5 MG/1
2.5 TABLET ORAL ONCE
Refills: 0 | Status: DISCONTINUED | OUTPATIENT
Start: 2021-05-26 | End: 2021-05-26

## 2021-05-26 RX ORDER — KETOROLAC TROMETHAMINE 30 MG/ML
30 SYRINGE (ML) INJECTION ONCE
Refills: 0 | Status: DISCONTINUED | OUTPATIENT
Start: 2021-05-26 | End: 2021-05-26

## 2021-05-26 RX ADMIN — PANTOPRAZOLE SODIUM 40 MILLIGRAM(S): 20 TABLET, DELAYED RELEASE ORAL at 05:09

## 2021-05-26 RX ADMIN — Medication 15 MILLIGRAM(S): at 13:25

## 2021-05-26 RX ADMIN — OXYCODONE HYDROCHLORIDE 2.5 MILLIGRAM(S): 5 TABLET ORAL at 09:00

## 2021-05-26 RX ADMIN — AMLODIPINE BESYLATE 5 MILLIGRAM(S): 2.5 TABLET ORAL at 05:05

## 2021-05-26 RX ADMIN — CEFTRIAXONE 100 MILLIGRAM(S): 500 INJECTION, POWDER, FOR SOLUTION INTRAMUSCULAR; INTRAVENOUS at 12:19

## 2021-05-26 RX ADMIN — Medication 650 MILLIGRAM(S): at 13:00

## 2021-05-26 RX ADMIN — ZOLPIDEM TARTRATE 5 MILLIGRAM(S): 10 TABLET ORAL at 21:36

## 2021-05-26 RX ADMIN — OXYCODONE HYDROCHLORIDE 2.5 MILLIGRAM(S): 5 TABLET ORAL at 07:57

## 2021-05-26 RX ADMIN — Medication 100 MILLIGRAM(S): at 17:35

## 2021-05-26 RX ADMIN — Medication 15 MILLIGRAM(S): at 14:48

## 2021-05-26 RX ADMIN — Medication 20 MILLIGRAM(S): at 12:54

## 2021-05-26 RX ADMIN — Medication 1 TABLET(S): at 11:41

## 2021-05-26 RX ADMIN — LISINOPRIL 20 MILLIGRAM(S): 2.5 TABLET ORAL at 05:05

## 2021-05-26 RX ADMIN — Medication 650 MILLIGRAM(S): at 12:18

## 2021-05-26 RX ADMIN — Medication 100 MILLIGRAM(S): at 05:05

## 2021-05-26 RX ADMIN — ENOXAPARIN SODIUM 40 MILLIGRAM(S): 100 INJECTION SUBCUTANEOUS at 11:42

## 2021-05-26 NOTE — CONSULT NOTE ADULT - ATTENDING COMMENTS
Atypical chest pain. Reproducible, positional.  Trops negative. Echo w/out regional WMA's. Further control BP. No indication for further CV testing.

## 2021-05-26 NOTE — DISCHARGE NOTE PROVIDER - NSDCFUADDINST_GEN_ALL_CORE_FT
Make appointment(s) to follow up with your physician(s) - bring all discharge paperwork including discharge medication list to follow up appointment

## 2021-05-26 NOTE — DISCHARGE NOTE PROVIDER - CARE PROVIDERS DIRECT ADDRESSES
[email or web address]

## 2021-05-26 NOTE — DISCHARGE NOTE PROVIDER - CARE PROVIDER_API CALL
Qasim Segundo J  CARDIOVASCULAR DISEASE  [address] [place] [address]  Phone: [phone]  Fax: [phone]  Follow Up Time:     Rey Pratt  INTERNAL MEDICINE  [facility], [id number] [address]  Phone: [phone]  Fax: [phone]  Follow Up Time:

## 2021-05-26 NOTE — CONSULT NOTE ADULT - PROBLEM SELECTOR RECOMMENDATION 2
Pt w/ known hx of HTN, w/ management in the past complicated by orthostatic hypotension as well as chronic LE swelling. Has seen Dr. Valerio Church in his office. Pt w/ known hx of HTN, w/ management in the past complicated by orthostatic hypotension. Has seen Dr. Valerio Church in his office twice in the past (2018 and 2019). Pt's EKG shows LVH, c/w poorly controlled HTN. During this admission, blood pressures have been persistently elevated, including w/ SBP in the 170s. Recommend the following home regimen on discharge, w/ titration to be done as an outpatient during follow-up w/ PCP and/or Dr. Church.  - Increase amlodipine to 10 mg daily  - C/w fosinopril 20 mg BID  - C/w lopressor 100 mg BID      *As no further inpatient cardiology w/u is indicated, pt is stable for discharge from our standpoint, and we will sign off at this time. Plan was d/w ACP [facility] [id number].

## 2021-05-26 NOTE — CONSULT NOTE ADULT - PROBLEM SELECTOR RECOMMENDATION 9
Pt's pain is midsternal/ R-sided and back. Trops neg x 2. PT NSR 60s-80s on telemetry. Pt p/w atypical chest pain, localized to midsternum/ radiating to R-side and R-back, intermittent, w/ chest wall tenderness when present, not a/w SOB/OAKLEY/orthopnea/palpitations, not radiating to the L side. Cardiac exam benign, w/ pt's LE edema chronic, stable, non-pitting. Trops neg x 2, Lipid profile wnl. EKG shows NSR, HR 58, notable for LVH. Throughout admission, has been in NSR 60s-80s on telemetry. TTE only showing mild diastolic dysfunction (Stage 1), which is a common finding at this pt's age, normal LVSF. Pt herself believes pain to be "psychosomatic" in the setting of increased stressors at home. Pain is likely to be MSK pain, multifactorial in nature from body habitus 2/2 scoliosis s/p multiple back surgeries w/ psychosocial stress also adding to "psychosomatic" picture. Suspicion for ACS or other cardiac abnormality is low at this time given pt's largely negative cardiac w/u.   - C/w pain management per primary team  - HTN management as below  - Recommend stress reduction tactics/ psychotherapy. This was discussed w/ pt, who was in agreement.   - No further inpatient cardiac w/u indicated at this time. From cardiology standpoint, pt is stable for discharge to home w/ appropriate outpatient f/u. Pt can f/u w/ her PCP and continue to see Dr. Church in clinic.

## 2021-05-26 NOTE — DISCHARGE NOTE PROVIDER - HOSPITAL COURSE
75 year old  female w/ PMHx of HTN, GERD, scoliosis s/p multiple back surgeries, who presents w/ chest pain for the past two days, w/ cardiology c/s for further input, and w/ CTA negative for PE, EKG and trops negative for ACS, and TTE showing only Stage I diastolic dysfunction, but w/ EKG significant for LVH and elevated blood pressure readings throughout her admission.  Pt seen by cardiology, pain is likely to be MSK pain, multifactorial in nature from body habitus 2/2 scoliosis s/p multiple back surgeries w/ psychosocial stress also adding to "psychosomatic" picture. Suspicion for ACS or other cardiac abnormality is low at this time given pt's largely negative cardiac w/u.   - C/w pain management per primary team  - HTN management as below  - Recommend stress reduction tactics/ psychotherapy 75 year old  female w/ PMHx of HTN, GERD, scoliosis s/p multiple back surgeries, who presents w/ chest pain for the past two days, w/ cardiology c/s for further input, and w/ CTA negative for PE, EKG and trops negative for ACS, and TTE showing only Stage I diastolic dysfunction, but w/ EKG significant for LVH and elevated blood pressure readings throughout her admission.  Pt seen by cardiology, pain is likely to be MSK pain, multifactorial in nature from body habitus 2/2 scoliosis s/p multiple back surgeries w/ psychosocial stress also adding to "psychosomatic" picture. Suspicion for ACS or other cardiac abnormality is low at this time given pt's largely negative cardiac w/u.   "Negative" Cardiac work-up - out patient TTE  Seen and cleared by Neurology - can follow as out patient for further work-up  Found to have UTI - treated with 3 day course Rocephin

## 2021-05-26 NOTE — DISCHARGE NOTE PROVIDER - NSDCMRMEDTOKEN_GEN_ALL_CORE_FT
amLODIPine 5 mg oral tablet: 1 tab(s) orally once a day  fosinopril 20 mg oral tablet: 1 tab(s) orally 2 times a day  hydrocodone-acetaminophen 5 mg-325 mg oral tablet: 1 tab(s) orally 4 times a day, As Needed  Metoprolol Tartrate 100 mg oral tablet: 1 tab(s) orally 2 times a day  MiraLax oral powder for reconstitution: 17 gram(s) orally once a day, As Needed  pantoprazole 40 mg oral delayed release tablet: 1 tab(s) orally once a day  potassium chloride 10 mEq oral capsule, extended release: 1 cap(s) orally 2 times a week (Mon &amp; Fri)  Refresh ophthalmic solution: 1 drop(s) to each affected eye , As Needed  Vitamin D2 50,000 intl units (1.25 mg) oral capsule: 1 cap(s) orally once a week  zolpidem 10 mg oral tablet: 1 tab(s) orally once a day (at bedtime), As Needed   amLODIPine 10 mg oral tablet: 1 tab(s) orally once a day  fosinopril 20 mg oral tablet: 1 tab(s) orally 2 times a day  Metoprolol Tartrate 100 mg oral tablet: 1 tab(s) orally 2 times a day  MiraLax oral powder for reconstitution: 17 gram(s) orally once a day, As Needed  Multiple Vitamins oral tablet: 1 tab(s) orally once a day  pantoprazole 40 mg oral delayed release tablet: 1 tab(s) orally once a day  Refresh ophthalmic solution: 1 drop(s) to each affected eye , As Needed  senna oral tablet: 2 tab(s) orally once a day (at bedtime), As needed, Constipation  Vitamin D2 50,000 intl units (1.25 mg) oral capsule: 1 cap(s) orally once a week  zolpidem 10 mg oral tablet: 1 tab(s) orally once a day (at bedtime), As Needed

## 2021-05-26 NOTE — DISCHARGE NOTE PROVIDER - NSDCCPCAREPLAN_GEN_ALL_CORE_FT
PRINCIPAL DISCHARGE DIAGNOSIS  Diagnosis: Chest pain  Assessment and Plan of Treatment: likely related to musculoskeletal      SECONDARY DISCHARGE DIAGNOSES  Diagnosis: HTN (Hypertension)  Assessment and Plan of Treatment: Low salt diet  Activity as tolerated.  Take all medication as prescribed.  Follow up with your medical doctor for routine blood pressure monitoring at your next visit.  Notify your doctor if you have any of the following symptoms:   Dizziness, Lightheadedness, Blurry vision, Headache, Chest pain, Shortness of breath      Diagnosis: UTI (urinary tract infection)  Assessment and Plan of Treatment: HOME CARE INSTRUCTIONS  f you were prescribed antibiotics, take them exactly as your caregiver instructs you. Finish the medication even if you feel better after you have only taken some of the medication.  Drink enough water and fluids to keep your urine clear or pale yellow.  Avoid caffeine, tea, and carbonated beverages. They tend to irritate your bladder.  Empty your bladder often. Avoid holding urine for long periods of time.  Empty your bladder before and after sexual intercourse.  After a bowel movement, women should cleanse from front to back. Use each tissue only once.  SEEK MEDICAL CARE IF:  You have back pain.  You develop a fever.  Your symptoms do not begin to resolve within 3 days.  SEEK IMMEDIATE MEDICAL CARE IF:  You have severe back pain or lower abdominal pain.  You develop chills.  You have nausea or vomiting.  You have continued burning or discomfort with urination.

## 2021-05-26 NOTE — CONSULT NOTE ADULT - SUBJECTIVE AND OBJECTIVE BOX
NOTE INCOMPLETE/ IN PROGRESS    Patient is a 75y old  Female who presents with a chief complaint of chest pain x 2 days (25 May 2021 19:58)      HPI:  Patient is a 75 year old  female w/PMhx HTN, GERD , scoliosis s/p multiple back surgeries, presents chest pain for the past two days.  Patient reports pain is 8/10 , difficult to describe quality, located in the mid-sternum  with radiation to right chest and upper back ,  occurring intermittently,   exacerbated with deep breathing and coughingas well a laying flat , not worsened with exertion, no  relieving factors , no associated shortness of breath , no palpitations, she has ongoing  lower extremity edema which is unchanged , no nausea or vomiting , symptoms are  progressively worsening  over the past two days.  Patient also reports ongoing social stress for the past few weeks , during this time she noticed decrease in exercise tolerance , no orthopnea and no PND. Patient reports no fever or chills , no cough . No recent travel or prolonged immobilization.    (25 May 2021 19:58)      PAST MEDICAL & SURGICAL HISTORY:  HTN (Hypertension)  Age Related Osteoporosis  History of Scoliosis  with dietrich&#x27;s mateo placement about 13 years ago  Malignant Neoplasm of Corpus Uteri  Scoliosis of Thoracic Spine  s/p dietrich&#x27;s mateo placement 13 years ago  S/P  Section   &amp;   S/P Appendectomy  S/P Wrist Surgery  right side  s/p left shoulder surgery  History of Tonsillectomy  S/P Cervical Polypectomy  2007  History of Hemorrhoidectomy        MEDICATIONS  (STANDING):  amLODIPine   Tablet 5 milliGRAM(s) Oral daily  cefTRIAXone   IVPB 1000 milliGRAM(s) IV Intermittent every 24 hours  enoxaparin Injectable 40 milliGRAM(s) SubCutaneous daily  ketorolac   Injectable 30 milliGRAM(s) IV Push once  lidocaine   Patch 1 Patch Transdermal once  lisinopril 20 milliGRAM(s) Oral daily  metoprolol tartrate 100 milliGRAM(s) Oral two times a day  multivitamin 1 Tablet(s) Oral daily  pantoprazole    Tablet 40 milliGRAM(s) Oral before breakfast    MEDICATIONS  (PRN):  acetaminophen   Tablet .. 650 milliGRAM(s) Oral every 4 hours PRN Mild Pain (1 - 3)  artificial  tears Solution 1 Drop(s) Both EYES two times a day PRN Dry Eyes  oxyCODONE    IR 5 milliGRAM(s) Oral every 6 hours PRN Moderate Pain (4 - 6)  polyethylene glycol 3350 17 Gram(s) Oral daily PRN Constipation  senna 2 Tablet(s) Oral at bedtime PRN Constipation  zolpidem 5 milliGRAM(s) Oral at bedtime PRN Insomnia  zolpidem 5 milliGRAM(s) Oral at bedtime PRN Insomnia      FAMILY HISTORY:  Family history of hypertension (Father, Mother)      REVIEW OF SYSTEMS  CONSTITUTIONAL: No weakness, fevers or chills  EYES/ENT: No visual changes;  No dysphagia  NECK: No pain or stiffness  RESPIRATORY: No cough, wheezing, hemoptysis; No shortness of breath  CARDIOVASCULAR: +R-sided/ mid-sternal chest pain; No palpitations; + LE edema (chronic)  EXTREMITIES: + LE edema (chronic), no cyanosis, no clubbing  GASTROINTESTINAL: No abdominal or epigastric pain. No nausea, vomiting, or hematemesis; No diarrhea or constipation. No melena or hematochezia.  BACK: + back pain (chronic)  GENITOURINARY: No dysuria, frequency or hematuria  NEUROLOGICAL: No numbness or weakness  MSK: no joint swelling or pain  SKIN: No itching, burning, rashes, or lesions   PSYCH: no agitation      SOCIAL HISTORY:  Pt is retired,  and lives at home w/ her . Pt assists  w/ his ADLs, and notes an increased amount of stress at home and increasing feelings of depression given her 's cancer diagnosis and home demands that she feels unable to keep up with alone. She is a former smoker, quitting over 50 years ago. Pt occasionally drinks 2-3 glasses of wine several times per week.       Vital Signs Last 24 Hrs  T(C): 37 (26 May 2021 10:50), Max: 37.2 (25 May 2021 16:13)  T(F): 98.6 (26 May 2021 10:50), Max: 98.9 (25 May 2021 16:13)  HR: 63 (26 May 2021 10:50) (56 - 78)  BP: 161/85 (26 May 2021 10:50) (154/77 - 186/91)  BP(mean): --  RR: 18 (26 May 2021 10:50) (16 - 18)  SpO2: 93% (26 May 2021 10:50) (93% - 98%)    PHYSICAL EXAM:  GENERAL: NAD, sitting up in bed eating breakfast   HEAD:  Atraumatic, Normocephalic  EYES: EOMI, PERRLA, conjunctiva and sclera clear  ENT: Moist mucous membranes  NECK: Supple, No JVD  CHEST/LUNG: Clear to auscultation bilaterally; No rales, rhonchi, wheezing, or rubs. Unlabored respirations  HEART: Regular rate and rhythm; No murmurs, rubs, or gallops  ABDOMEN: BSx4; Soft, nontender, nondistended  EXTREMITIES:  2+ Peripheral Pulses, brisk capillary refill. No clubbing, cyanosis, nonpitting edema b/l LE  NERVOUS SYSTEM:  A&Ox3, no focal deficits   SKIN: No rashes or lesions  PSYCH: Normal affect, euthymic mood      I&O's Detail    25 May 2021 07:01  -  26 May 2021 07:00  --------------------------------------------------------  IN:    Oral Fluid: 240 mL  Total IN: 240 mL    OUT:  Total OUT: 0 mL    Total NET: 240 mL        LABS:                        14.3   8.94  )-----------( 274      ( 26 May 2021 06:14 )             43.8         140  |  101  |  8   ----------------------------<  129<H>  3.6   |  24  |  0.56    Ca    10.1      26 May 2021 06:14    TPro  8.0  /  Alb  4.6  /  TBili  0.8  /  DBili  x   /  AST  17  /  ALT  14  /  AlkPhos  83          PT/INR - ( 25 May 2021 12:03 )   PT: 12.1 sec;   INR: 1.01 ratio         PTT - ( 25 May 2021 12:03 )  PTT:28.3 sec  Urinalysis Basic - ( 25 May 2021 13:03 )    Color: Dark Orange / Appearance: Slightly Turbid / S.014 / pH: x  Gluc: x / Ketone: Negative  / Bili: Moderate / Urobili: 4 mg/dL   Blood: x / Protein: Trace / Nitrite: Positive   Leuk Esterase: Large / RBC: 10 /hpf / WBC 69 /HPF   Sq Epi: x / Non Sq Epi: 0 /hpf / Bacteria: Negative      I&O's Summary    25 May 2021 07:01  -  26 May 2021 07:00  --------------------------------------------------------  IN: 240 mL / OUT: 0 mL / NET: 240 mL      BNP  RADIOLOGY & ADDITIONAL STUDIES:    EKG    ECHO FINDINGS: NOTE INCOMPLETE/ IN PROGRESS    Patient is a 75y old  Female who presents with a chief complaint of chest pain x 2 days (25 May 2021 19:58)      HPI:  Patient is a 75 year old  female w/ PMHx of HTN, GERD, scoliosis s/p multiple back surgeries, who presents w/ chest pain for the past two days.  Patient reports pain is 8/10 , difficult to describe quality, located in the mid-sternum  with radiation to right chest and upper back, occurring intermittently, exacerbated with deep breathing and coughing as well a laying flat, not worsened with exertion, no relieving factors, no associated shortness of breath, no palpitations, she has ongoing  lower extremity edema which is unchanged, no nausea or vomiting, symptoms are  progressively worsening over the past two days. Patient also reports ongoing social stress for the past few weeks, during this time she noticed decrease in exercise tolerance, no orthopnea and no PND. Patient reports no fever or chills, no cough. No recent travel or prolonged immobilization. (25 May 2021 19:58)    Cardiology was c/s for input regarding pt's CP. Pt seen and examined today at the bedside. As stated in the HPI, pt endorses sudden onset of midsternal/ R sided chest and back pain beginning two days ago in the setting of an increasing amount of stress at home. Pt states that the pain is intermittent and she is unable to identify any specific inciting factor/ trigger. When pain is present, it is pleuritic in nature, and is associated w/ R chest wall tenderness. She does not describe any SOB, OAKLEY, palpitations, L-sided CP, jaw pain, L arm pain or N/V. Pt has chronic LE swelling, which has remained stable for years. When asked why she believes she has developed this pain, pt states that she is feeling incredibly depressed at home. When she has these feelings, she is used to just having to "tough it out." However, she describes that she feels increasingly overwhelmed by her responsibilities at home and by being her 's primary caregiver at home. Pt's  is wheelchair bound w/ COPD and colon cancer, and completely dependent on her for his ADLs. As pt's 's health has precipitously declined during the COVID pandemic,...      PAST MEDICAL & SURGICAL HISTORY:  HTN (Hypertension)  Age Related Osteoporosis  History of Scoliosis  with dietrich&#x27;s mateo placement about 13 years ago  Malignant Neoplasm of Corpus Uteri  Scoliosis of Thoracic Spine  s/p dietrich&#x27;s mateo placement 13 years ago  S/P  Section   &amp;   S/P Appendectomy  S/P Wrist Surgery  right side  s/p left shoulder surgery  History of Tonsillectomy  S/P Cervical Polypectomy  2007  History of Hemorrhoidectomy        MEDICATIONS  (STANDING):  amLODIPine   Tablet 5 milliGRAM(s) Oral daily  cefTRIAXone   IVPB 1000 milliGRAM(s) IV Intermittent every 24 hours  enoxaparin Injectable 40 milliGRAM(s) SubCutaneous daily  ketorolac   Injectable 30 milliGRAM(s) IV Push once  lidocaine   Patch 1 Patch Transdermal once  lisinopril 20 milliGRAM(s) Oral daily  metoprolol tartrate 100 milliGRAM(s) Oral two times a day  multivitamin 1 Tablet(s) Oral daily  pantoprazole    Tablet 40 milliGRAM(s) Oral before breakfast    MEDICATIONS  (PRN):  acetaminophen   Tablet .. 650 milliGRAM(s) Oral every 4 hours PRN Mild Pain (1 - 3)  artificial  tears Solution 1 Drop(s) Both EYES two times a day PRN Dry Eyes  oxyCODONE    IR 5 milliGRAM(s) Oral every 6 hours PRN Moderate Pain (4 - 6)  polyethylene glycol 3350 17 Gram(s) Oral daily PRN Constipation  senna 2 Tablet(s) Oral at bedtime PRN Constipation  zolpidem 5 milliGRAM(s) Oral at bedtime PRN Insomnia  zolpidem 5 milliGRAM(s) Oral at bedtime PRN Insomnia      FAMILY HISTORY:  Family history of hypertension (Father, Mother)      REVIEW OF SYSTEMS  CONSTITUTIONAL: No weakness, fevers or chills  EYES/ENT: No visual changes;  No dysphagia  NECK: No pain or stiffness  RESPIRATORY: No cough, wheezing, hemoptysis; No shortness of breath  CARDIOVASCULAR: +R-sided/ mid-sternal chest pain; No palpitations; + LE edema (chronic)  EXTREMITIES: + LE edema (chronic), no cyanosis, no clubbing  GASTROINTESTINAL: No abdominal or epigastric pain. No nausea, vomiting, or hematemesis; No diarrhea or constipation. No melena or hematochezia.  BACK: + back pain (chronic)  GENITOURINARY: No dysuria, frequency or hematuria  NEUROLOGICAL: No numbness or weakness  MSK: no joint swelling or pain  SKIN: No itching, burning, rashes, or lesions   PSYCH: + depression, anxiety      SOCIAL HISTORY:  Pt is retired,  and lives at home w/ her . Pt assists  w/ his ADLs, and notes an increased amount of stress at home and increasing feelings of depression given her 's cancer diagnosis and home demands that she feels unable to keep up with alone. She is a former smoker, quitting over 50 years ago. Pt occasionally drinks 2-3 glasses of wine several times per week.       Vital Signs Last 24 Hrs  T(C): 37 (26 May 2021 10:50), Max: 37.2 (25 May 2021 16:13)  T(F): 98.6 (26 May 2021 10:50), Max: 98.9 (25 May 2021 16:13)  HR: 63 (26 May 2021 10:50) (56 - 78)  BP: 161/85 (26 May 2021 10:50) (154/77 - 186/91)  BP(mean): --  RR: 18 (26 May 2021 10:50) (16 - 18)  SpO2: 93% (26 May 2021 10:50) (93% - 98%)    PHYSICAL EXAM:  GENERAL: NAD, sitting up in bed eating breakfast   HEAD:  Atraumatic, Normocephalic  EYES: EOMI, PERRLA, conjunctiva and sclera clear  ENT: Moist mucous membranes  NECK: Supple, No JVD  CHEST/LUNG: Clear to auscultation bilaterally; No rales, rhonchi, wheezing, or rubs. Unlabored respirations  HEART: Regular rate and rhythm; No murmurs, rubs, or gallops  ABDOMEN: BSx4; Soft, nontender, nondistended  EXTREMITIES:  2+ Peripheral Pulses, brisk capillary refill. No clubbing, cyanosis, nonpitting edema b/l LE  NERVOUS SYSTEM:  A&Ox3, no focal deficits   SKIN: No rashes or lesions  PSYCH: Normal affect, euthymic mood      I&O's Detail    25 May 2021 07:01  -  26 May 2021 07:00  --------------------------------------------------------  IN:    Oral Fluid: 240 mL  Total IN: 240 mL    OUT:  Total OUT: 0 mL    Total NET: 240 mL        LABS:                        14.3   8.94  )-----------( 274      ( 26 May 2021 06:14 )             43.8         140  |  101  |  8   ----------------------------<  129<H>  3.6   |  24  |  0.56    Ca    10.1      26 May 2021 06:14    TPro  8.0  /  Alb  4.6  /  TBili  0.8  /  DBili  x   /  AST  17  /  ALT  14  /  AlkPhos  83          PT/INR - ( 25 May 2021 12:03 )   PT: 12.1 sec;   INR: 1.01 ratio         PTT - ( 25 May 2021 12:03 )  PTT:28.3 sec  Urinalysis Basic - ( 25 May 2021 13:03 )    Color: Dark Orange / Appearance: Slightly Turbid / S.014 / pH: x  Gluc: x / Ketone: Negative  / Bili: Moderate / Urobili: 4 mg/dL   Blood: x / Protein: Trace / Nitrite: Positive   Leuk Esterase: Large / RBC: 10 /hpf / WBC 69 /HPF   Sq Epi: x / Non Sq Epi: 0 /hpf / Bacteria: Negative      I&O's Summary    25 May 2021 07:01  -  26 May 2021 07:00  --------------------------------------------------------  IN: 240 mL / OUT: 0 mL / NET: 240 mL      Pro-BNP: 104      RADIOLOGY & ADDITIONAL STUDIES:    EKG    ECHO FINDINGS: Patient is a 75y old  Female who presents with a chief complaint of chest pain x 2 days (25 May 2021 19:58)      HPI:  Patient is a 75 year old  female w/ PMHx of HTN, GERD, scoliosis s/p multiple back surgeries, who presents w/ chest pain for the past two days.  Patient reports pain is 8/10 , difficult to describe quality, located in the mid-sternum  with radiation to right chest and upper back, occurring intermittently, exacerbated with deep breathing and coughing as well a laying flat, not worsened with exertion, no relieving factors, no associated shortness of breath, no palpitations, she has ongoing  lower extremity edema which is unchanged, no nausea or vomiting, symptoms are  progressively worsening over the past two days. Patient also reports ongoing social stress for the past few weeks, during this time she noticed decrease in exercise tolerance, no orthopnea and no PND. Patient reports no fever or chills, no cough. No recent travel or prolonged immobilization. (25 May 2021 19:58)    Cardiology was c/s for input regarding pt's CP. Pt seen and examined today at the bedside. As stated in the HPI, pt endorses sudden onset of midsternal/ R sided chest and back pain beginning two days ago in the setting of an increasing amount of stress at home. Pt states that the pain is intermittent and she is unable to identify any specific inciting factor/ trigger. When pain is present, it is pleuritic in nature, and is associated w/ R chest wall tenderness. She does not describe any SOB, OAKLEY, palpitations, L-sided CP, jaw pain, L arm pain or N/V. Pt has chronic LE swelling, which has remained stable for years. When asked why she believes she has developed this pain, pt states that she is feeling incredibly depressed at home. When she has these feelings, she is used to just having to "tough it out." However, she describes that she feels increasingly overwhelmed by her responsibilities at home and by being her 's primary caregiver at home. Pt's  is wheelchair bound w/ COPD and colon cancer, and completely dependent on her for his ADLs. As pt's 's health has precipitously declined during the COVID pandemic, and she has had to take on more, she has been feeling that she can't "tough it out" like she used to. Pt becomes teary when she describes this, adding, "this may sound weird, but I think my chest pain is related to stress and my emotions."     Of note, review of [employer] shows that pt had seen Dr. Church twice in the past for management of essential hypertension complicated by orthostatic hypotension. Review of these notes indicates that pt reported a similar R-sided chest pain in 2018 and 2019 during these visits. Per pt, the pain that led to this current admission, however, is new, but she indicates that she does also believe that her history of scoliosis/ multiple back surgeries contributed to both the old and new pain. So far during the admission, patient underwent CTA Chest, which was negative for PE, and troponins were negative x 2. Lipid profile wnl. Pt's BP has been persistently elevated up to SBP 170s. Her EKG was NSR, w/ HR in the 60s and significant for LVH. On tele monitoring, she has also been in NSR w/ HR in 60s-80s. For her chest pain, pt has been on a pain control regimen that includes acetaminophen, oxycodone and lidocaine patches, and this has helped to get the pain under control. For her HTN, pt's home regimen has mostly been resumed, w/ pt receiving lisinopril 20 mg qd (takes fosinopril 20 mg BID at home), amlodipine 5 mg, and lopressor 100 mg BID.       PAST MEDICAL & SURGICAL HISTORY:  HTN (Hypertension)  Age Related Osteoporosis  History of Scoliosis  with dietrich&#x27;s mateo placement about 13 years ago  Malignant Neoplasm of Corpus Uteri  Scoliosis of Thoracic Spine  s/p dietrich&#x27;s mateo placement 13 years ago  S/P  Section   &amp;   S/P Appendectomy  S/P Wrist Surgery  right side  s/p left shoulder surgery  History of Tonsillectomy  S/P Cervical Polypectomy    History of Hemorrhoidectomy        MEDICATIONS  (STANDING):  amLODIPine   Tablet 5 milliGRAM(s) Oral daily  cefTRIAXone   IVPB 1000 milliGRAM(s) IV Intermittent every 24 hours  enoxaparin Injectable 40 milliGRAM(s) SubCutaneous daily  ketorolac   Injectable 30 milliGRAM(s) IV Push once  lidocaine   Patch 1 Patch Transdermal once  lisinopril 20 milliGRAM(s) Oral daily  metoprolol tartrate 100 milliGRAM(s) Oral two times a day  multivitamin 1 Tablet(s) Oral daily  pantoprazole    Tablet 40 milliGRAM(s) Oral before breakfast    MEDICATIONS  (PRN):  acetaminophen   Tablet .. 650 milliGRAM(s) Oral every 4 hours PRN Mild Pain (1 - 3)  artificial  tears Solution 1 Drop(s) Both EYES two times a day PRN Dry Eyes  oxyCODONE    IR 5 milliGRAM(s) Oral every 6 hours PRN Moderate Pain (4 - 6)  polyethylene glycol 3350 17 Gram(s) Oral daily PRN Constipation  senna 2 Tablet(s) Oral at bedtime PRN Constipation  zolpidem 5 milliGRAM(s) Oral at bedtime PRN Insomnia  zolpidem 5 milliGRAM(s) Oral at bedtime PRN Insomnia      FAMILY HISTORY:  Family history of hypertension (Father, Mother)      REVIEW OF SYSTEMS  CONSTITUTIONAL: No weakness, fevers or chills  EYES/ENT: No visual changes;  No dysphagia  NECK: No pain or stiffness  RESPIRATORY: No cough, wheezing, hemoptysis; No shortness of breath  CARDIOVASCULAR: +R-sided/ mid-sternal chest pain; No palpitations; + LE edema (chronic)  EXTREMITIES: + LE edema (chronic), no cyanosis, no clubbing  GASTROINTESTINAL: No abdominal or epigastric pain. No nausea, vomiting, or hematemesis; No diarrhea or constipation. No melena or hematochezia.  BACK: + back pain (chronic)  GENITOURINARY: No dysuria, frequency or hematuria  NEUROLOGICAL: No numbness or weakness  MSK: no joint swelling or pain  SKIN: No itching, burning, rashes, or lesions   PSYCH: + depression, anxiety      SOCIAL HISTORY:  Pt is retired,  and lives at home w/ her . Pt assists  w/ his ADLs, and notes an increased amount of stress at home and increasing feelings of depression given her 's cancer diagnosis and home demands that she feels unable to keep up with alone. She is a former smoker, quitting over 50 years ago. Pt occasionally drinks 2-3 glasses of wine several times per week.       Vital Signs Last 24 Hrs  T(C): 37 (26 May 2021 10:50), Max: 37.2 (25 May 2021 16:13)  T(F): 98.6 (26 May 2021 10:50), Max: 98.9 (25 May 2021 16:13)  HR: 63 (26 May 2021 10:50) (56 - 78)  BP: 161/85 (26 May 2021 10:50) (154/77 - 186/91)  BP(mean): --  RR: 18 (26 May 2021 10:50) (16 - 18)  SpO2: 93% (26 May 2021 10:50) (93% - 98%)    PHYSICAL EXAM:  GENERAL: NAD, sitting up in bed eating breakfast   HEAD:  Atraumatic, Normocephalic  EYES: EOMI, PERRLA, conjunctiva and sclera clear  ENT: Moist mucous membranes  NECK: Supple, No JVD  CHEST/LUNG: Clear to auscultation bilaterally; No rales, rhonchi, wheezing, or rubs. Unlabored respirations  HEART: Regular rate and rhythm; No murmurs, rubs, or gallops  ABDOMEN: BSx4; Soft, nontender, nondistended  EXTREMITIES:  2+ Peripheral Pulses, brisk capillary refill. No clubbing, cyanosis, nonpitting edema b/l LE  NERVOUS SYSTEM:  A&Ox3, no focal deficits   SKIN: No rashes or lesions  PSYCH: Normal affect, euthymic mood      I&O's Detail    25 May 2021 07:01  -  26 May 2021 07:00  --------------------------------------------------------  IN:    Oral Fluid: 240 mL  Total IN: 240 mL    OUT:  Total OUT: 0 mL    Total NET: 240 mL        LABS:                        14.3   8.94  )-----------( 274      ( 26 May 2021 06:14 )             43.8         140  |  101  |  8   ----------------------------<  129<H>  3.6   |  24  |  0.56    Ca    10.1      26 May 2021 06:14    TPro  8.0  /  Alb  4.6  /  TBili  0.8  /  DBili  x   /  AST  17  /  ALT  14  /  AlkPhos  83          PT/INR - ( 25 May 2021 12:03 )   PT: 12.1 sec;   INR: 1.01 ratio         PTT - ( 25 May 2021 12:03 )  PTT:28.3 sec  Urinalysis Basic - ( 25 May 2021 13:03 )    Color: Dark Orange / Appearance: Slightly Turbid / S.014 / pH: x  Gluc: x / Ketone: Negative  / Bili: Moderate / Urobili: 4 mg/dL   Blood: x / Protein: Trace / Nitrite: Positive   Leuk Esterase: Large / RBC: 10 /hpf / WBC 69 /HPF   Sq Epi: x / Non Sq Epi: 0 /hpf / Bacteria: Negative      I&O's Summary    25 May 2021 07:01  -  26 May 2021 07:00  --------------------------------------------------------  IN: 240 mL / OUT: 0 mL / NET: 240 mL      Pro-BNP: 104    LIPID PROFILE:  Cholesterol- 188, TGs- 133, HDL- 66, LDL- 95    HgA1c: 5.7%    RADIOLOGY & ADDITIONAL STUDIES:  1) CTA CHEST  IMPRESSION:  No pulmonary embolism.    VAMSHI SEGUNDO MD; Resident Radiology  This document has been electronically signed.  MICHELLE GARCIA MD; Attending Radiologist  This document has been electronically signed. May 25 2021  3:58PM      2) EKG  NSR, HR 58, LVH    3) ECHOCARDIOGRAM  ECHO FINDINGS:  Patient name: PATRICIA COATS  YOB: 1945   Age: 75 (F)   MR#: [id number]  Study Date: 2021  [facility] Roberto Son DEV[facility]  Study quality: Technically fair  Referring Physician: Jose Miguel Hernandez MD  Blood Pressure: 165/83 mmHg  Height: 183 cm  Weight: 64 kg  BSA: 1.8 m2  Heart Rate: 60 mmHg  ------------------------------------------------------------------------  PROCEDURE: Transthoracic echocardiogram with 2-D, M-Mode  and complete spectral and color flow Doppler.  INDICATION: Chest Pain (R07.9)  ------------------------------------------------------------------------  Dimensions:    Normal Values:  LA:     3.9    2.0 - 4.0 cm  Ao:     3.4    2.0 - 3.8 cm  SEPTUM: 1.1    0.6 - 1.2 cm  PWT:    1.1    0.6 - 1.1 cm  LVIDd:  4.0    3.0 - 5.6 cm  LVIDs:  2.4    1.8 - 4.0 cm  Derived variables:  LVMI: 88 g/m2  RWT: 0.57  Fractional short: 40 %  EF (Visual Estimate): 65-70 %  Doppler Peak Velocity (m/sec): AoV=1.2  ------------------------------------------------------------------------  Observations:  Mitral Valve: Mitral annular calcification, otherwise  normal mitral valve.  Aortic Valve/Aorta: Normal trileaflet aortic valve. Peak  transaortic valve gradient equals 6 mm Hg, mean transaortic  valve gradient equals 3 mm Hg, aortic valve velocity time  integral equals 28 cm, estimated aortic valve area equals  2.2 sqcm. Minimal aortic regurgitation.  Peak left  ventricular outflow tract gradient equals 3 mm Hg, mean  gradient is equal to 2 mm Hg, LVOT velocity time integral  equals 23 cm.  Aortic Root: 3.4 cm.  Left Atrium: Normal left atrium.  LA volume index = 20  cc/m2.  Left Ventricle: Normal left ventricular systolic function.  No segmental wall motion abnormalities. Normal left  ventricular internal dimensions and wall thicknesses. Mild  diastolic dysfunction (Stage I).  Right Heart: Normal right atrium. Normal right ventricular  size and function. Normal tricuspid valve. Minimal  tricuspid regurgitation. Normal pulmonic valve. Minimal  pulmonic regurgitation.  Pericardium/Pleura: Normal pericardium with no pericardial  effusion.  ------------------------------------------------------------------------  Conclusions:  1. Mitral annular calcification, otherwise normal mitral  valve.  2. Normal trileaflet aortic valve. Minimal aortic  regurgitation.  3. Normal left ventricular systolic function. No segmental  wall motion abnormalities.  4. Mild diastolic dysfunction (Stage I).  5. Normal right ventricular size and function.  *** Compared with echocardiogram of 2017, no  significant changes noted.  ------------------------------------------------------------------------  Confirmed on  2021 - 13:33:42 by Kevin Camarillo M.D.  ------------------------------------------------------------------------

## 2021-05-27 ENCOUNTER — TRANSCRIPTION ENCOUNTER (OUTPATIENT)
Age: 76
End: 2021-05-27

## 2021-05-27 VITALS — SYSTOLIC BLOOD PRESSURE: 160 MMHG | DIASTOLIC BLOOD PRESSURE: 89 MMHG

## 2021-05-27 PROCEDURE — 99285 EMERGENCY DEPT VISIT HI MDM: CPT | Mod: 25

## 2021-05-27 PROCEDURE — 96374 THER/PROPH/DIAG INJ IV PUSH: CPT

## 2021-05-27 PROCEDURE — 80053 COMPREHEN METABOLIC PANEL: CPT

## 2021-05-27 PROCEDURE — 85730 THROMBOPLASTIN TIME PARTIAL: CPT

## 2021-05-27 PROCEDURE — 82962 GLUCOSE BLOOD TEST: CPT

## 2021-05-27 PROCEDURE — 87086 URINE CULTURE/COLONY COUNT: CPT

## 2021-05-27 PROCEDURE — U0005: CPT

## 2021-05-27 PROCEDURE — 93306 TTE W/DOPPLER COMPLETE: CPT

## 2021-05-27 PROCEDURE — 83036 HEMOGLOBIN GLYCOSYLATED A1C: CPT

## 2021-05-27 PROCEDURE — 71275 CT ANGIOGRAPHY CHEST: CPT

## 2021-05-27 PROCEDURE — 85379 FIBRIN DEGRADATION QUANT: CPT

## 2021-05-27 PROCEDURE — 93005 ELECTROCARDIOGRAM TRACING: CPT

## 2021-05-27 PROCEDURE — 84484 ASSAY OF TROPONIN QUANT: CPT

## 2021-05-27 PROCEDURE — 85025 COMPLETE CBC W/AUTO DIFF WBC: CPT

## 2021-05-27 PROCEDURE — 80061 LIPID PANEL: CPT

## 2021-05-27 PROCEDURE — 81001 URINALYSIS AUTO W/SCOPE: CPT

## 2021-05-27 PROCEDURE — 86769 SARS-COV-2 COVID-19 ANTIBODY: CPT

## 2021-05-27 PROCEDURE — 85610 PROTHROMBIN TIME: CPT

## 2021-05-27 PROCEDURE — 87186 SC STD MICRODIL/AGAR DIL: CPT

## 2021-05-27 PROCEDURE — 71045 X-RAY EXAM CHEST 1 VIEW: CPT

## 2021-05-27 PROCEDURE — U0003: CPT

## 2021-05-27 PROCEDURE — 83880 ASSAY OF NATRIURETIC PEPTIDE: CPT

## 2021-05-27 RX ORDER — POTASSIUM CHLORIDE 20 MEQ
1 PACKET (EA) ORAL
Qty: 0 | Refills: 0 | DISCHARGE

## 2021-05-27 RX ORDER — SENNA PLUS 8.6 MG/1
2 TABLET ORAL
Qty: 0 | Refills: 0 | DISCHARGE
Start: 2021-05-27

## 2021-05-27 RX ORDER — AMLODIPINE BESYLATE 2.5 MG/1
1 TABLET ORAL
Qty: 30 | Refills: 0
Start: 2021-05-27 | End: 2021-06-25

## 2021-05-27 RX ORDER — AMLODIPINE BESYLATE 2.5 MG/1
1 TABLET ORAL
Qty: 0 | Refills: 0 | DISCHARGE

## 2021-05-27 RX ADMIN — OXYCODONE HYDROCHLORIDE 5 MILLIGRAM(S): 5 TABLET ORAL at 06:01

## 2021-05-27 RX ADMIN — LISINOPRIL 20 MILLIGRAM(S): 2.5 TABLET ORAL at 05:01

## 2021-05-27 RX ADMIN — ENOXAPARIN SODIUM 40 MILLIGRAM(S): 100 INJECTION SUBCUTANEOUS at 11:35

## 2021-05-27 RX ADMIN — AMLODIPINE BESYLATE 10 MILLIGRAM(S): 2.5 TABLET ORAL at 05:00

## 2021-05-27 RX ADMIN — PANTOPRAZOLE SODIUM 40 MILLIGRAM(S): 20 TABLET, DELAYED RELEASE ORAL at 05:00

## 2021-05-27 RX ADMIN — OXYCODONE HYDROCHLORIDE 5 MILLIGRAM(S): 5 TABLET ORAL at 05:01

## 2021-05-27 RX ADMIN — OXYCODONE HYDROCHLORIDE 5 MILLIGRAM(S): 5 TABLET ORAL at 12:29

## 2021-05-27 RX ADMIN — CEFTRIAXONE 100 MILLIGRAM(S): 500 INJECTION, POWDER, FOR SOLUTION INTRAMUSCULAR; INTRAVENOUS at 12:30

## 2021-05-27 RX ADMIN — Medication 100 MILLIGRAM(S): at 05:01

## 2021-05-27 RX ADMIN — Medication 1 TABLET(S): at 11:35

## 2021-05-27 RX ADMIN — OXYCODONE HYDROCHLORIDE 5 MILLIGRAM(S): 5 TABLET ORAL at 14:00

## 2021-05-27 NOTE — CONSULT NOTE ADULT - ASSESSMENT
Impression:  This is a 75 year old woman with PMHx HTN, GERD, and scoliosis s/p multiple back surgeries, who performs all of her ADLs and IADLs and is taking care of her  who has been diagnosed with cancer, but who has had chronic back pain and some ambulatory dysfunction, all of which seems to have gotten worse over the past year.  She describes difficulty taking a full breath.  She has some chronic decreased sensation on the right side which she states she has had for a while.  She has been told in the past she is unable to get MRIs.  She has seen pain management in the past but stopped going as she does not want to "pop pills."  She has not followed with a spine surgeon recently but states she has zero interest in doing so and would not go for another surgery.    She presents with two days of chest pain in the setting of increased stress.     Diagnosis:  Scoliosis and chronic back pain    Recommendations:  unclear to me that her back is causing her chest pain.  r/o other etiology as appropriate.    she does have chronic back pain from scoliosis and back surgery  no neurological contraindication to discharge     she tells me she cannot get an mri, but would probably benefit from CT of the cervical, thoracic, and lumbar spine.  This can be done as an outpatient as it is for a chronic condition.  Based on what is seen, would potentially refer to surgery.  However patient states she has no interest in seeing a surgeon and would not go for surgery again.  I would potentially send her to physical therapy, which she states she has attended many times with only small benefit.  I would consider sending her to pain management, which again she stated she does not have much interest in.  I told her she is welcome to see me as an outpatient an I would do the above workup and follow-through.  She states she has been through similar workup several times in the past and is not particularly interested.  I provided my information and advised her to follow up if she changes her mind at any time.  Discussed the above with her extensively.  She expressed understanding.  
75 year old  female w/ PMHx of HTN, GERD, scoliosis s/p multiple back surgeries, who presents w/ chest pain for the past two days, w/ cardiology c/s for further input, and w/ CTA negative for PE, EKG and trops negative for ACS, and TTE showing only Stage I diastolic dysfunction, but w/ EKG significant for LVH and elevated blood pressure readings throughout her admission.

## 2021-05-27 NOTE — PROVIDER CONTACT NOTE (OTHER) - ASSESSMENT
Pt complaining of ongoing chest pain unrelieved by oxy 5mg  Pt states she feels pain reduced to 5/10 increasing to a 7/10 when taking a deep breath, pt also complains of some feeling of sob  tele NS Pt complaining of ongoing chest pain unrelieved by oxy 5mg  Pt states she feels pain reduced to 5/10 increasing to a 7/10 when taking a deep breath, pt also complains of some feeling of sob, denies any headache, complains of anxiety  tele NS   156/92 HR 80 98% on RA 18 RR   manual /89

## 2021-05-27 NOTE — PROGRESS NOTE ADULT - SUBJECTIVE AND OBJECTIVE BOX
DATE OF SERVICE: 21 @ 10:31  CHIEF COMPLAINT:Patient is a 75y old  Female who presents with a chief complaint of chest pain x 2 days (27 May 2021 10:03)    	        PAST MEDICAL & SURGICAL HISTORY:  HTN (Hypertension)    Age Related Osteoporosis    History of Scoliosis  with dietrich&#x27;s mateo placement about 13 years ago    Malignant Neoplasm of Corpus Uteri    Scoliosis of Thoracic Spine  s/p dietrich&#x27;s mateo placement 13 years ago    S/P  Section   &amp;     S/P Appendectomy    S/P Wrist Surgery  right side    s/p left shoulder surgery    History of Tonsillectomy    S/P Cervical Polypectomy      History of Hemorrhoidectomy              REVIEW OF SYSTEMS:  CONSTITUTIONAL: No fever, weight loss, or fatigue  EYES: No eye pain, visual disturbances, or discharge  NECK: No pain or stiffness  RESPIRATORY: No cough, wheezing, chills or hemoptysis; No Shortness of Breath  CARDIOVASCULAR: chest pain w/ movement  GASTROINTESTINAL: No abdominal or epigastric pain. No nausea, vomiting, or hematemesis; No diarrhea or constipation. No melena or hematochezia.  GENITOURINARY: No dysuria, frequency, hematuria, or incontinence  NEUROLOGICAL: No headaches, memory loss, loss of strength, numbness, or tremors      Medications:  MEDICATIONS  (STANDING):  amLODIPine   Tablet 10 milliGRAM(s) Oral daily  cefTRIAXone   IVPB 1000 milliGRAM(s) IV Intermittent every 24 hours  enoxaparin Injectable 40 milliGRAM(s) SubCutaneous daily  lidocaine   Patch 1 Patch Transdermal once  lisinopril 20 milliGRAM(s) Oral daily  metoprolol tartrate 100 milliGRAM(s) Oral two times a day  multivitamin 1 Tablet(s) Oral daily  pantoprazole    Tablet 40 milliGRAM(s) Oral before breakfast    MEDICATIONS  (PRN):  acetaminophen   Tablet .. 650 milliGRAM(s) Oral every 4 hours PRN Mild Pain (1 - 3)  artificial  tears Solution 1 Drop(s) Both EYES two times a day PRN Dry Eyes  oxyCODONE    IR 5 milliGRAM(s) Oral every 6 hours PRN Moderate Pain (4 - 6)  polyethylene glycol 3350 17 Gram(s) Oral daily PRN Constipation  senna 2 Tablet(s) Oral at bedtime PRN Constipation  zolpidem 5 milliGRAM(s) Oral at bedtime PRN Insomnia  zolpidem 5 milliGRAM(s) Oral at bedtime PRN Insomnia    	    PHYSICAL EXAM:  T(C): 36.5 (21 @ 04:57), Max: 37 (21 @ 10:50)  HR: 67 (21 @ 04:57) (62 - 100)  BP: 162/94 (21 @ 04:57) (135/75 - 162/94)  RR: 19 (21 @ 04:57) (18 - 19)  SpO2: 95% (21 @ 04:57) (93% - 97%)  Wt(kg): --  I&O's Summary    26 May 2021 07:01  -  27 May 2021 07:00  --------------------------------------------------------  IN: 240 mL / OUT: 0 mL / NET: 240 mL        Appearance: Normal	  HEENT:   Normal oral mucosa, PERRL, EOMI	  Lymphatic: No lymphadenopathy  Cardiovascular: Normal S1 S2, No JVD, No murmurs, No edema  Respiratory: Lungs clear to auscultation	  Psychiatry: A & O  Gastrointestinal:  Soft, Non-tender, + BS	  Skin: No rashes, No ecchymoses, No cyanosis	  Neurologic: Non-focal  Extremities: Normal range of motion, No clubbing, cyanosis or edema  Vascular: Peripheral pulses palpable 2+ bilaterally    TELEMETRY: 	    ECG:  	  RADIOLOGY:  OTHER: 	  	  LABS:	 	    CARDIAC MARKERS:                                14.3   8.94  )-----------( 274      ( 26 May 2021 06:14 )             43.8         140  |  101  |  8   ----------------------------<  129<H>  3.6   |  24  |  0.56    Ca    10.1      26 May 2021 06:14    TPro  8.0  /  Alb  4.6  /  TBili  0.8  /  DBili  x   /  AST  17  /  ALT  14  /  AlkPhos  83      proBNP:   Lipid Profile:   HgA1c:   TSH:

## 2021-05-27 NOTE — DISCHARGE NOTE NURSING/CASE MANAGEMENT/SOCIAL WORK - PATIENT PORTAL LINK FT
You can access the FollowMyHealth Patient Portal offered by [facility] by registering at the following website: [email or web address]. By joining [facility]’s FollowMyHealth portal, you will also be able to view your health information using other applications (apps) compatible with our system.

## 2021-05-27 NOTE — PROGRESS NOTE ADULT - ASSESSMENT
75 y /o F w/PMhx HTN, GERD , scoliosis s/p multiple back surgeries, presents chest pain       Problem/Plan - 1:  ·  Problem: Chest pain.  Plan: trop wnl x 2 , ekg w/nonspecific twave changes , CTA neg for PE .   - telemetry   - transthoracic echocardiogram   - Cardiology consult noted      Problem/Plan - 2:  ·  Problem: Back pain.  Plan: may be contributing to presentation   - trial of lidocaine patch   - oxycodone/ tylenol prn.   neuro eval     Problem/Plan - 3:  ·  Problem: HTN (Hypertension).  Plan: - amlodipine   - Fosinopril (therapeutic equivalent)  - metoprolol.      Problem/Plan - 4:  ·  Problem: UTI (urinary tract infection). Plan: + UA , leukocytosis  s/p ceftriaxone  - f/u urine cultures.     Problem/Plan - 5:  ·  Problem: Need for prophylactic measure.  Plan: - lmwh for dvt ppx.     pt

## 2021-05-27 NOTE — PROVIDER CONTACT NOTE (OTHER) - ACTION/TREATMENT ORDERED:
NP aware, continue to monitor NP aware, at bedside, as per NP ok for DC home with family and outpt follow up

## 2021-05-27 NOTE — CONSULT NOTE ADULT - SUBJECTIVE AND OBJECTIVE BOX
Admitting Diagnosis:  Chest pain [R07.9]  CHEST PAIN, UNSPECIFIED        HPI:  This is a 75 year old woman with PMHx HTN, GERD, and scoliosis s/p multiple back surgeries, who performs all of her ADLs and IADLs and is taking care of her  who has been diagnosed with cancer, but who has had chronic back pain and some ambulatory dysfunction, all of which seems to have gotten worse over the past year.  She describes difficulty taking a full breath.  She has some chronic decreased sensation on the right side which she states she has had for a while.  She has been told in the past she is unable to get MRIs.  She has seen pain management in the past but stopped going as she does not want to "pop pills."  She has not followed with a spine surgeon recently but states she has zero interest in doing so and would not go for another surgery.    She presents with two days of chest pain in the setting of increased stress.       ******    Past Medical History:  HTN (Hypertension) [401.9]    Age Related Osteoporosis [733.01]    History of Scoliosis [V13.59]  with dietrich&#x27;s mateo placement about 13 years ago    Malignant Neoplasm of Corpus Uteri [182.0]        Past Surgical History:  Scoliosis of Thoracic Spine [737.30]  s/p dietrich&#x27;s mateo placement 13 years ago    S/P  Section [654.20]   &amp;     S/P Appendectomy [V45.89]    S/P Wrist Surgery [V45.89]  right side    s/p left shoulder surgery [719.41]    History of Tonsillectomy [V45.89]    S/P Cervical Polypectomy [V45.89]      History of Hemorrhoidectomy [V45.89]          Social History:  No toxic habits    Family History:  FAMILY HISTORY:  Family history of hypertension (Father, Mother)        Allergies:  No Known Allergies      ROS:  Constitutional: Patient offers no complaints of fevers or significant weight loss  Ears, Nose, Mouth and Throat: The patient presents with no abnormalities of the head, ears, eyes, nose or throat  Skin: Patient offers no concerns of new rashes or lesions  Respiratory: The patient presents with no abnormalities of the respiratory tract  Cardiovascular: The patient presents with no cardiac abnormalities  Gastrointestinal: The patient presents with no abnormalities of the GI system  Genitourinary: The patient presents with no dysuria, hematuria or frequent urination  Neurological: See HPI  Endocrine: Patient offers no complaints of excessive thirst, urination, or heat/cold intolerance    Advanced care planning reviewed and noted in the chart.    Medications:  acetaminophen   Tablet .. 650 milliGRAM(s) Oral every 4 hours PRN  amLODIPine   Tablet 10 milliGRAM(s) Oral daily  artificial  tears Solution 1 Drop(s) Both EYES two times a day PRN  cefTRIAXone   IVPB 1000 milliGRAM(s) IV Intermittent every 24 hours  enoxaparin Injectable 40 milliGRAM(s) SubCutaneous daily  lidocaine   Patch 1 Patch Transdermal once  lisinopril 20 milliGRAM(s) Oral daily  metoprolol tartrate 100 milliGRAM(s) Oral two times a day  multivitamin 1 Tablet(s) Oral daily  oxyCODONE    IR 5 milliGRAM(s) Oral every 6 hours PRN  pantoprazole    Tablet 40 milliGRAM(s) Oral before breakfast  polyethylene glycol 3350 17 Gram(s) Oral daily PRN  senna 2 Tablet(s) Oral at bedtime PRN  zolpidem 5 milliGRAM(s) Oral at bedtime PRN  zolpidem 5 milliGRAM(s) Oral at bedtime PRN      Labs:  CBC Full  -  ( 26 May 2021 06:14 )  WBC Count : 8.94 K/uL  RBC Count : 4.70 M/uL  Hemoglobin : 14.3 g/dL  Hematocrit : 43.8 %  Platelet Count - Automated : 274 K/uL  Mean Cell Volume : 93.2 fl  Mean Cell Hemoglobin : 30.4 pg  Mean Cell Hemoglobin Concentration : 32.6 gm/dL  Auto Neutrophil # : 6.96 K/uL  Auto Lymphocyte # : 1.25 K/uL  Auto Monocyte # : 0.64 K/uL  Auto Eosinophil # : 0.05 K/uL  Auto Basophil # : 0.00 K/uL  Auto Neutrophil % : 77.8 %  Auto Lymphocyte % : 14.0 %  Auto Monocyte % : 7.2 %  Auto Eosinophil % : 0.6 %  Auto Basophil % : 0.0 %        140  |  101  |  8   ----------------------------<  129<H>  3.6   |  24  |  0.56    Ca    10.1      26 May 2021 06:14    TPro  8.0  /  Alb  4.6  /  TBili  0.8  /  DBili  x   /  AST  17  /  ALT  14  /  AlkPhos  83  05-26    CAPILLARY BLOOD GLUCOSE        LIVER FUNCTIONS - ( 26 May 2021 06:14 )  Alb: 4.6 g/dL / Pro: 8.0 g/dL / ALK PHOS: 83 U/L / ALT: 14 U/L / AST: 17 U/L / GGT: x           PT/INR - ( 25 May 2021 12:03 )   PT: 12.1 sec;   INR: 1.01 ratio         PTT - ( 25 May 2021 12:03 )  PTT:28.3 sec  Urinalysis Basic - ( 25 May 2021 13:03 )    Color: Dark Orange / Appearance: Slightly Turbid / S.014 / pH: x  Gluc: x / Ketone: Negative  / Bili: Moderate / Urobili: 4 mg/dL   Blood: x / Protein: Trace / Nitrite: Positive   Leuk Esterase: Large / RBC: 10 /hpf / WBC 69 /HPF   Sq Epi: x / Non Sq Epi: 0 /hpf / Bacteria: Negative      Female    Vitals:  Vital Signs Last 24 Hrs  T(C): 36.5 (27 May 2021 04:57), Max: 37 (26 May 2021 10:50)  T(F): 97.7 (27 May 2021 04:57), Max: 98.6 (26 May 2021 10:50)  HR: 67 (27 May 2021 04:57) (62 - 100)  BP: 162/94 (27 May 2021 04:57) (135/75 - 162/94)  BP(mean): --  RR: 19 (27 May 2021 04:57) (18 - 19)  SpO2: 95% (27 May 2021 04:57) (93% - 97%)    NEUROLOGICAL EXAM:    Mental status: Awake, alert, and in no apparent distress. Oriented to person, place and time. Language function is normal. Recent memory, digit span and concentration were normal.     Cranial Nerves: Extraocular movements were intact. Visual field were full. Fundoscopic exam was deferred. Facial sensation was intact to light touch. There was no facial asymmetry. The palate was upgoing symmetrically and tongue was midline.  Shoulder shrug was full bilaterally    Motor exam: Bulk and tone were normal. No drift arms or legs.  Fine finger movements were symmetric and normal. There was no pronator drift    Reflexes: 1+ in the bilateral upper extremities. 1+ in the bilateral knees, absent at ankles.  Negative vargas    Sensation: Somewhat decreased on right to light touch but cannot say for certain     Coordination: Finger-nose-finger and heel-to-shin was without dysmetria.     Gait: leans to right sometimes but otherwise walks with normal gait         EXAM:  XR CHEST PORTABLE ROUTINE 1V                            PROCEDURE DATE:  2021            INTERPRETATION:  Chest x-ray was obtained on May 25, 2021.    INDICATION: Chest pain.    IMPRESSION:    The heart is normal in size. Lungs are clear. No pleural effusion. No pneumothorax. Orthopedic hardware is seen in the thoracolumbar spine.                ANKUR MOREJON MD; Attending Radiologist  This document has been electronically signed. May 25 2021  2:54PM

## 2022-06-16 ENCOUNTER — APPOINTMENT (OUTPATIENT)
Dept: INTERNAL MEDICINE | Facility: CLINIC | Age: 77
End: 2022-06-16

## 2022-06-20 ENCOUNTER — NON-APPOINTMENT (OUTPATIENT)
Age: 77
End: 2022-06-20

## 2022-06-20 ENCOUNTER — APPOINTMENT (OUTPATIENT)
Dept: INTERNAL MEDICINE | Facility: CLINIC | Age: 77
End: 2022-06-20
Payer: MEDICARE

## 2022-06-20 VITALS
WEIGHT: 145 LBS | OXYGEN SATURATION: 97 % | HEART RATE: 62 BPM | BODY MASS INDEX: 28.47 KG/M2 | HEIGHT: 60 IN | TEMPERATURE: 98 F | RESPIRATION RATE: 16 BRPM | DIASTOLIC BLOOD PRESSURE: 99 MMHG | SYSTOLIC BLOOD PRESSURE: 176 MMHG

## 2022-06-20 VITALS — DIASTOLIC BLOOD PRESSURE: 80 MMHG | SYSTOLIC BLOOD PRESSURE: 160 MMHG

## 2022-06-20 DIAGNOSIS — M41.9 SCOLIOSIS, UNSPECIFIED: ICD-10-CM

## 2022-06-20 DIAGNOSIS — H26.9 UNSPECIFIED CATARACT: ICD-10-CM

## 2022-06-20 LAB
25(OH)D3 SERPL-MCNC: 73.1 NG/ML
ALBUMIN SERPL ELPH-MCNC: 4.6 G/DL
ALP BLD-CCNC: 68 U/L
ALT SERPL-CCNC: 14 U/L
ANION GAP SERPL CALC-SCNC: 11 MMOL/L
AST SERPL-CCNC: 19 U/L
BASOPHILS # BLD AUTO: 0.01 K/UL
BASOPHILS NFR BLD AUTO: 0.2 %
BILIRUB SERPL-MCNC: 0.3 MG/DL
BUN SERPL-MCNC: 11 MG/DL
CALCIUM SERPL-MCNC: 9.7 MG/DL
CHLORIDE SERPL-SCNC: 104 MMOL/L
CHOLEST SERPL-MCNC: 217 MG/DL
CO2 SERPL-SCNC: 28 MMOL/L
CREAT SERPL-MCNC: 0.52 MG/DL
EGFR: 95 ML/MIN/1.73M2
EOSINOPHIL # BLD AUTO: 0.07 K/UL
EOSINOPHIL NFR BLD AUTO: 1.1 %
ESTIMATED AVERAGE GLUCOSE: 123 MG/DL
GLUCOSE SERPL-MCNC: 116 MG/DL
HBA1C MFR BLD HPLC: 5.9 %
HCT VFR BLD CALC: 42.3 %
HDLC SERPL-MCNC: 73 MG/DL
HGB BLD-MCNC: 13.3 G/DL
IMM GRANULOCYTES NFR BLD AUTO: 0.2 %
LDLC SERPL CALC-MCNC: 117 MG/DL
LYMPHOCYTES # BLD AUTO: 1.39 K/UL
LYMPHOCYTES NFR BLD AUTO: 21.5 %
MAN DIFF?: NORMAL
MCHC RBC-ENTMCNC: 30.1 PG
MCHC RBC-ENTMCNC: 31.4 GM/DL
MCV RBC AUTO: 95.7 FL
MONOCYTES # BLD AUTO: 0.54 K/UL
MONOCYTES NFR BLD AUTO: 8.3 %
NEUTROPHILS # BLD AUTO: 4.45 K/UL
NEUTROPHILS NFR BLD AUTO: 68.7 %
NONHDLC SERPL-MCNC: 143 MG/DL
PLATELET # BLD AUTO: 288 K/UL
POTASSIUM SERPL-SCNC: 4.9 MMOL/L
PROT SERPL-MCNC: 7.3 G/DL
RBC # BLD: 4.42 M/UL
RBC # FLD: 12.8 %
SODIUM SERPL-SCNC: 143 MMOL/L
TRIGL SERPL-MCNC: 132 MG/DL
TSH SERPL-ACNC: 1.34 UIU/ML
WBC # FLD AUTO: 6.47 K/UL

## 2022-06-20 PROCEDURE — 36415 COLL VENOUS BLD VENIPUNCTURE: CPT

## 2022-06-20 PROCEDURE — 93000 ELECTROCARDIOGRAM COMPLETE: CPT | Mod: 59

## 2022-06-20 PROCEDURE — G0444 DEPRESSION SCREEN ANNUAL: CPT | Mod: 59

## 2022-06-20 PROCEDURE — 99203 OFFICE O/P NEW LOW 30 MIN: CPT | Mod: 25

## 2022-06-20 RX ORDER — POTASSIUM CHLORIDE 750 MG/1
10 TABLET, EXTENDED RELEASE ORAL DAILY
Refills: 0 | Status: DISCONTINUED | COMMUNITY
Start: 2018-10-15 | End: 2022-06-20

## 2022-07-12 NOTE — PATIENT PROFILE ADULT. - NSSUBSTANCEUSE_GEN_ALL_CORE_SD
Dorian Sue, thank you for getting back to me  I just have a quick question  Does her PCP need to send some sort of scheduling ticket ? When patient's son called from your practice, he said a  told them that in order to move her appt up, her PCP would have to order a covid scheduling ticket which we don't usually do  Is there anything else that's needed or is she all set ? Thank you!
I appreciate your help, thank you!!
I believe you asked me on Friday to remind you to put in a covid scheduling ticket for this patient
I havent ordered the vaccine before  Can they tell us exactly which one to order in epic?
Left message for [facility] to find out what is needed
[employer] [facility],  We did get your voice mail today 7/11/22  Baruch Cheadle is scheduled to come to our office ([facility] [address] [facility]) on 7/13/22 5pm (we've asked her to come at 4:30)  Let me know if there's anything else I can do for you 
Steve Virk,  No, she's good  We took care of it in our office because they walked in  No ticket needed  (Normally one is needed but she is good to go)  Nothing else needs to be done  Thank you 
never used

## 2022-07-20 NOTE — H&P ADULT - PROBLEM SELECTOR PROBLEM 1
Chest pain Render Risk Assessment In Note?: no Detail Level: Zone Other (Free Text): Not itchy. If present in a month RTC. Patient verbalized understanding and agreement Note Text (......Xxx Chief Complaint.): This diagnosis correlates with the

## 2022-07-26 ENCOUNTER — NON-APPOINTMENT (OUTPATIENT)
Age: 77
End: 2022-07-26

## 2022-07-26 ENCOUNTER — APPOINTMENT (OUTPATIENT)
Dept: CARDIOLOGY | Facility: CLINIC | Age: 77
End: 2022-07-26

## 2022-07-26 VITALS
DIASTOLIC BLOOD PRESSURE: 87 MMHG | OXYGEN SATURATION: 98 % | HEART RATE: 53 BPM | WEIGHT: 145 LBS | BODY MASS INDEX: 28.47 KG/M2 | HEIGHT: 60 IN | SYSTOLIC BLOOD PRESSURE: 157 MMHG

## 2022-07-26 DIAGNOSIS — R60.9 EDEMA, UNSPECIFIED: ICD-10-CM

## 2022-07-26 DIAGNOSIS — R07.89 OTHER CHEST PAIN: ICD-10-CM

## 2022-07-26 PROCEDURE — 93000 ELECTROCARDIOGRAM COMPLETE: CPT

## 2022-07-26 PROCEDURE — 99215 OFFICE O/P EST HI 40 MIN: CPT | Mod: 25

## 2022-08-16 ENCOUNTER — NON-APPOINTMENT (OUTPATIENT)
Age: 77
End: 2022-08-16

## 2022-08-16 DIAGNOSIS — U07.1 COVID-19: ICD-10-CM

## 2022-08-17 ENCOUNTER — RX RENEWAL (OUTPATIENT)
Age: 77
End: 2022-08-17

## 2022-09-20 ENCOUNTER — APPOINTMENT (OUTPATIENT)
Dept: INTERNAL MEDICINE | Facility: CLINIC | Age: 77
End: 2022-09-20

## 2022-09-20 ENCOUNTER — NON-APPOINTMENT (OUTPATIENT)
Age: 77
End: 2022-09-20

## 2022-09-20 VITALS — DIASTOLIC BLOOD PRESSURE: 80 MMHG | SYSTOLIC BLOOD PRESSURE: 142 MMHG

## 2022-09-20 VITALS
TEMPERATURE: 97 F | WEIGHT: 145 LBS | SYSTOLIC BLOOD PRESSURE: 166 MMHG | OXYGEN SATURATION: 96 % | BODY MASS INDEX: 28.47 KG/M2 | HEART RATE: 65 BPM | DIASTOLIC BLOOD PRESSURE: 92 MMHG | HEIGHT: 60 IN

## 2022-09-20 DIAGNOSIS — R00.2 PALPITATIONS: ICD-10-CM

## 2022-09-20 DIAGNOSIS — E78.00 PURE HYPERCHOLESTEROLEMIA, UNSPECIFIED: ICD-10-CM

## 2022-09-20 PROCEDURE — 93000 ELECTROCARDIOGRAM COMPLETE: CPT | Mod: 59

## 2022-09-20 PROCEDURE — 99213 OFFICE O/P EST LOW 20 MIN: CPT | Mod: 25

## 2022-09-20 PROCEDURE — 36415 COLL VENOUS BLD VENIPUNCTURE: CPT

## 2022-09-20 RX ORDER — NIRMATRELVIR AND RITONAVIR 300-100 MG
20 X 150 MG & KIT ORAL
Qty: 30 | Refills: 0 | Status: DISCONTINUED | COMMUNITY
Start: 2022-08-16 | End: 2022-09-20

## 2022-09-22 LAB
ALBUMIN SERPL ELPH-MCNC: 4.8 G/DL
ALP BLD-CCNC: 70 U/L
ALT SERPL-CCNC: 19 U/L
ANION GAP SERPL CALC-SCNC: 11 MMOL/L
AST SERPL-CCNC: 21 U/L
BASOPHILS # BLD AUTO: 0.01 K/UL
BASOPHILS NFR BLD AUTO: 0.1 %
BILIRUB SERPL-MCNC: 0.3 MG/DL
BUN SERPL-MCNC: 19 MG/DL
CALCIUM SERPL-MCNC: 9.7 MG/DL
CHLORIDE SERPL-SCNC: 105 MMOL/L
CHOLEST SERPL-MCNC: 185 MG/DL
CO2 SERPL-SCNC: 27 MMOL/L
CREAT SERPL-MCNC: 0.62 MG/DL
EGFR: 91 ML/MIN/1.73M2
EOSINOPHIL # BLD AUTO: 0.05 K/UL
EOSINOPHIL NFR BLD AUTO: 0.6 %
ESTIMATED AVERAGE GLUCOSE: 123 MG/DL
GLUCOSE SERPL-MCNC: 116 MG/DL
HBA1C MFR BLD HPLC: 5.9 %
HCT VFR BLD CALC: 42.3 %
HDLC SERPL-MCNC: 69 MG/DL
HGB BLD-MCNC: 13.1 G/DL
IMM GRANULOCYTES NFR BLD AUTO: 0.2 %
LDLC SERPL CALC-MCNC: 76 MG/DL
LYMPHOCYTES # BLD AUTO: 1.9 K/UL
LYMPHOCYTES NFR BLD AUTO: 23.4 %
MAN DIFF?: NORMAL
MCHC RBC-ENTMCNC: 29.8 PG
MCHC RBC-ENTMCNC: 31 GM/DL
MCV RBC AUTO: 96.4 FL
MONOCYTES # BLD AUTO: 0.84 K/UL
MONOCYTES NFR BLD AUTO: 10.4 %
NEUTROPHILS # BLD AUTO: 5.29 K/UL
NEUTROPHILS NFR BLD AUTO: 65.3 %
NONHDLC SERPL-MCNC: 115 MG/DL
PLATELET # BLD AUTO: 268 K/UL
POTASSIUM SERPL-SCNC: 4.1 MMOL/L
PROT SERPL-MCNC: 7 G/DL
RBC # BLD: 4.39 M/UL
RBC # FLD: 14.6 %
SODIUM SERPL-SCNC: 143 MMOL/L
TRIGL SERPL-MCNC: 195 MG/DL
TSH SERPL-ACNC: 0.95 UIU/ML
WBC # FLD AUTO: 8.11 K/UL

## 2022-10-20 ENCOUNTER — APPOINTMENT (OUTPATIENT)
Dept: CV DIAGNOSITCS | Facility: HOSPITAL | Age: 77
End: 2022-10-20

## 2022-10-24 ENCOUNTER — RX RENEWAL (OUTPATIENT)
Age: 77
End: 2022-10-24

## 2022-10-27 ENCOUNTER — RX RENEWAL (OUTPATIENT)
Age: 77
End: 2022-10-27

## 2022-11-12 ENCOUNTER — TRANSCRIPTION ENCOUNTER (OUTPATIENT)
Age: 77
End: 2022-11-12

## 2023-01-16 ENCOUNTER — APPOINTMENT (OUTPATIENT)
Dept: INTERNAL MEDICINE | Facility: CLINIC | Age: 78
End: 2023-01-16
Payer: MEDICARE

## 2023-01-26 ENCOUNTER — APPOINTMENT (OUTPATIENT)
Dept: INTERNAL MEDICINE | Facility: CLINIC | Age: 78
End: 2023-01-26
Payer: MEDICARE

## 2023-01-26 PROCEDURE — 99441: CPT

## 2023-02-12 ENCOUNTER — RX RENEWAL (OUTPATIENT)
Age: 78
End: 2023-02-12

## 2023-02-13 ENCOUNTER — RX RENEWAL (OUTPATIENT)
Age: 78
End: 2023-02-13

## 2023-03-13 ENCOUNTER — RX RENEWAL (OUTPATIENT)
Age: 78
End: 2023-03-13

## 2023-03-14 ENCOUNTER — RX RENEWAL (OUTPATIENT)
Age: 78
End: 2023-03-14

## 2023-06-20 ENCOUNTER — APPOINTMENT (OUTPATIENT)
Dept: INTERNAL MEDICINE | Facility: CLINIC | Age: 78
End: 2023-06-20

## 2023-07-20 ENCOUNTER — NON-APPOINTMENT (OUTPATIENT)
Age: 78
End: 2023-07-20

## 2023-07-20 ENCOUNTER — APPOINTMENT (OUTPATIENT)
Dept: INTERNAL MEDICINE | Facility: CLINIC | Age: 78
End: 2023-07-20
Payer: MEDICARE

## 2023-07-20 VITALS
HEART RATE: 54 BPM | BODY MASS INDEX: 27.88 KG/M2 | DIASTOLIC BLOOD PRESSURE: 82 MMHG | TEMPERATURE: 96.6 F | WEIGHT: 142 LBS | SYSTOLIC BLOOD PRESSURE: 135 MMHG | OXYGEN SATURATION: 97 % | HEIGHT: 60 IN | RESPIRATION RATE: 15 BRPM

## 2023-07-20 DIAGNOSIS — R73.03 PREDIABETES.: ICD-10-CM

## 2023-07-20 DIAGNOSIS — Z00.00 ENCOUNTER FOR GENERAL ADULT MEDICAL EXAMINATION W/OUT ABNORMAL FINDINGS: ICD-10-CM

## 2023-07-20 DIAGNOSIS — M81.0 AGE-RELATED OSTEOPOROSIS W/OUT CURRENT PATHOLOGICAL FRACTURE: ICD-10-CM

## 2023-07-20 PROCEDURE — G0439: CPT

## 2023-07-20 PROCEDURE — 93000 ELECTROCARDIOGRAM COMPLETE: CPT | Mod: 59

## 2023-07-20 PROCEDURE — 36415 COLL VENOUS BLD VENIPUNCTURE: CPT

## 2023-07-21 LAB
25(OH)D3 SERPL-MCNC: 46.8 NG/ML
ALBUMIN SERPL ELPH-MCNC: 4.5 G/DL
ALP BLD-CCNC: 84 U/L
ALT SERPL-CCNC: 13 U/L
ANION GAP SERPL CALC-SCNC: 12 MMOL/L
AST SERPL-CCNC: 18 U/L
BILIRUB SERPL-MCNC: 0.3 MG/DL
BUN SERPL-MCNC: 19 MG/DL
CALCIUM SERPL-MCNC: 9.7 MG/DL
CHLORIDE SERPL-SCNC: 106 MMOL/L
CHOLEST SERPL-MCNC: 178 MG/DL
CO2 SERPL-SCNC: 26 MMOL/L
CREAT SERPL-MCNC: 0.65 MG/DL
EGFR: 90 ML/MIN/1.73M2
ESTIMATED AVERAGE GLUCOSE: 117 MG/DL
GLUCOSE SERPL-MCNC: 98 MG/DL
HBA1C MFR BLD HPLC: 5.7 %
HCT VFR BLD CALC: 41.3 %
HDLC SERPL-MCNC: 59 MG/DL
HGB BLD-MCNC: 12.9 G/DL
LDLC SERPL CALC-MCNC: 81 MG/DL
MCHC RBC-ENTMCNC: 30.6 PG
MCHC RBC-ENTMCNC: 31.2 GM/DL
MCV RBC AUTO: 98.1 FL
NONHDLC SERPL-MCNC: 119 MG/DL
PLATELET # BLD AUTO: 257 K/UL
POTASSIUM SERPL-SCNC: 4.5 MMOL/L
PROT SERPL-MCNC: 6.7 G/DL
RBC # BLD: 4.21 M/UL
RBC # FLD: 13.4 %
SODIUM SERPL-SCNC: 143 MMOL/L
TRIGL SERPL-MCNC: 234 MG/DL
TSH SERPL-ACNC: 1.38 UIU/ML
WBC # FLD AUTO: 6.74 K/UL

## 2023-08-14 ENCOUNTER — APPOINTMENT (OUTPATIENT)
Dept: INTERNAL MEDICINE | Facility: CLINIC | Age: 78
End: 2023-08-14
Payer: MEDICARE

## 2023-08-14 VITALS
WEIGHT: 142 LBS | OXYGEN SATURATION: 96 % | RESPIRATION RATE: 15 BRPM | HEART RATE: 68 BPM | HEIGHT: 60 IN | TEMPERATURE: 98.4 F | SYSTOLIC BLOOD PRESSURE: 126 MMHG | BODY MASS INDEX: 27.88 KG/M2 | DIASTOLIC BLOOD PRESSURE: 70 MMHG

## 2023-08-14 PROCEDURE — 99214 OFFICE O/P EST MOD 30 MIN: CPT

## 2023-08-14 NOTE — REVIEW OF SYSTEMS
[Fatigue] : fatigue [Back Pain] : back pain [Insomnia] : insomnia [Anxiety] : anxiety [Fever] : no fever [Chills] : no chills [Pain] : no pain [Vision Problems] : no vision problems [Nasal Discharge] : no nasal discharge [Chest Pain] : no chest pain [Palpitations] : no palpitations [Lower Ext Edema] : no lower extremity edema [Shortness Of Breath] : no shortness of breath [Wheezing] : no wheezing [Cough] : no cough [Dyspnea on Exertion] : no dyspnea on exertion [Abdominal Pain] : no abdominal pain [Nausea] : no nausea [Muscle Weakness] : no muscle weakness [Muscle Pain] : no muscle pain [Headache] : no headache [Dizziness] : no dizziness [Fainting] : no fainting [Suicidal] : not suicidal [Depression] : no depression [FreeTextEntry9] : chronic back pain

## 2023-08-14 NOTE — PLAN
[FreeTextEntry1] : HTN - Blood pressure at goal - continue amlodipine 10 mg QD, fosinopril 40 mg QD, metoprolol tartrate 50 mg BID,  - advised to log BP at home. - advised on dietary changes, increasing exercise, avoiding excess salt  Depression - advised to stop zoloft as patient reports worsening fatigue and grogginess with medication  - continue journaling, meditating - patient will be making appointment with psychiatrist, Dr Colon  - advised to titrate down on zolipidem as it could also be contributing to symptoms

## 2023-08-14 NOTE — HISTORY OF PRESENT ILLNESS
[FreeTextEntry1] : follow up [de-identified] : 78 yo F pmh HTN, prediabetes, HLD, herniated discs, scoliosis, uterine cancer s/p hysterectomy, osteoporosis presents for follow up Accompanied by daughter, Carmina and  Charles  Patient's metoprolol dose was decreased due to bradycardia She was started on zoloft for depression and anxiety. She reports increased fatigue and grogginess with medication. She has noted no changes in symptoms of depression and anxiety though she also reports she is currently not feeling symptoms.

## 2023-08-14 NOTE — PHYSICAL EXAM
[No Acute Distress] : no acute distress [Well-Appearing] : well-appearing [No Respiratory Distress] : no respiratory distress  [No Accessory Muscle Use] : no accessory muscle use [Clear to Auscultation] : lungs were clear to auscultation bilaterally [Normal Rate] : normal rate  [Regular Rhythm] : with a regular rhythm [Normal S1, S2] : normal S1 and S2 [No Focal Deficits] : no focal deficits [Speech Grossly Normal] : speech grossly normal [Alert and Oriented x3] : oriented to person, place, and time

## 2023-10-03 ENCOUNTER — APPOINTMENT (OUTPATIENT)
Dept: INTERNAL MEDICINE | Facility: CLINIC | Age: 78
End: 2023-10-03
Payer: MEDICARE

## 2023-10-03 VITALS
TEMPERATURE: 96.8 F | BODY MASS INDEX: 27.88 KG/M2 | DIASTOLIC BLOOD PRESSURE: 80 MMHG | SYSTOLIC BLOOD PRESSURE: 150 MMHG | HEART RATE: 61 BPM | HEIGHT: 60 IN | OXYGEN SATURATION: 96 % | WEIGHT: 142 LBS

## 2023-10-03 VITALS — DIASTOLIC BLOOD PRESSURE: 70 MMHG | SYSTOLIC BLOOD PRESSURE: 124 MMHG

## 2023-10-03 DIAGNOSIS — Z23 ENCOUNTER FOR IMMUNIZATION: ICD-10-CM

## 2023-10-03 PROCEDURE — G0008: CPT

## 2023-10-03 PROCEDURE — 90662 IIV NO PRSV INCREASED AG IM: CPT

## 2023-10-03 PROCEDURE — 99215 OFFICE O/P EST HI 40 MIN: CPT | Mod: 25

## 2023-10-26 ENCOUNTER — NON-APPOINTMENT (OUTPATIENT)
Age: 78
End: 2023-10-26

## 2023-11-28 ENCOUNTER — APPOINTMENT (OUTPATIENT)
Dept: INTERNAL MEDICINE | Facility: CLINIC | Age: 78
End: 2023-11-28
Payer: MEDICARE

## 2023-11-28 VITALS
BODY MASS INDEX: 27.88 KG/M2 | SYSTOLIC BLOOD PRESSURE: 118 MMHG | WEIGHT: 142 LBS | OXYGEN SATURATION: 96 % | HEIGHT: 60 IN | TEMPERATURE: 97.1 F | RESPIRATION RATE: 15 BRPM | DIASTOLIC BLOOD PRESSURE: 78 MMHG | HEART RATE: 56 BPM

## 2023-11-28 DIAGNOSIS — R26.81 UNSTEADINESS ON FEET: ICD-10-CM

## 2023-11-28 PROCEDURE — 99215 OFFICE O/P EST HI 40 MIN: CPT | Mod: 25

## 2023-12-01 NOTE — H&P ADULT - PMH
Take the full jardiance 25 mg once a day  Increase lasix to 40 mg twice a day until Monday and call me with weights  Continue all other medications  RTO in 2 weeks  Cut fluids to 64 ounces or less a day and <2000 mg sodium
Age Related Osteoporosis    History of Scoliosis  with dietrich's mateo placement about 13 years ago  HTN (Hypertension)    Malignant Neoplasm of Corpus Uteri

## 2023-12-04 PROBLEM — R26.81 UNSTEADY GAIT: Status: ACTIVE | Noted: 2023-12-04

## 2024-01-29 ENCOUNTER — APPOINTMENT (OUTPATIENT)
Dept: INTERNAL MEDICINE | Facility: CLINIC | Age: 79
End: 2024-01-29
Payer: MEDICARE

## 2024-01-29 DIAGNOSIS — M51.26 OTHER INTERVERTEBRAL DISC DISPLACEMENT, LUMBAR REGION: ICD-10-CM

## 2024-01-29 PROCEDURE — 99443: CPT

## 2024-01-29 RX ORDER — HYDROCODONE BITARTRATE AND ACETAMINOPHEN 5; 325 MG/1; MG/1
5-325 TABLET ORAL EVERY 6 HOURS
Qty: 120 | Refills: 0 | Status: DISCONTINUED | COMMUNITY
Start: 2023-02-12 | End: 2024-01-29

## 2024-03-20 ENCOUNTER — RX RENEWAL (OUTPATIENT)
Age: 79
End: 2024-03-20

## 2024-03-20 RX ORDER — AMLODIPINE BESYLATE 10 MG/1
10 TABLET ORAL DAILY
Qty: 90 | Refills: 3 | Status: ACTIVE | COMMUNITY
Start: 2024-03-20 | End: 1900-01-01

## 2024-03-24 ENCOUNTER — RX RENEWAL (OUTPATIENT)
Age: 79
End: 2024-03-24

## 2024-03-25 RX ORDER — METOPROLOL TARTRATE 50 MG/1
50 TABLET, FILM COATED ORAL TWICE DAILY
Qty: 180 | Refills: 3 | Status: ACTIVE | COMMUNITY
Start: 2024-03-25 | End: 1900-01-01

## 2024-05-10 ENCOUNTER — APPOINTMENT (OUTPATIENT)
Dept: INTERNAL MEDICINE | Facility: CLINIC | Age: 79
End: 2024-05-10
Payer: MEDICARE

## 2024-05-10 VITALS
RESPIRATION RATE: 16 BRPM | WEIGHT: 140 LBS | HEIGHT: 60 IN | TEMPERATURE: 98.1 F | OXYGEN SATURATION: 95 % | BODY MASS INDEX: 27.48 KG/M2 | HEART RATE: 62 BPM | SYSTOLIC BLOOD PRESSURE: 150 MMHG | DIASTOLIC BLOOD PRESSURE: 77 MMHG

## 2024-05-10 VITALS — DIASTOLIC BLOOD PRESSURE: 80 MMHG | SYSTOLIC BLOOD PRESSURE: 134 MMHG

## 2024-05-10 DIAGNOSIS — G47.00 INSOMNIA, UNSPECIFIED: ICD-10-CM

## 2024-05-10 DIAGNOSIS — Z12.39 ENCOUNTER FOR OTHER SCREENING FOR MALIGNANT NEOPLASM OF BREAST: ICD-10-CM

## 2024-05-10 DIAGNOSIS — I10 ESSENTIAL (PRIMARY) HYPERTENSION: ICD-10-CM

## 2024-05-10 DIAGNOSIS — F32.A DEPRESSION, UNSPECIFIED: ICD-10-CM

## 2024-05-10 DIAGNOSIS — M54.2 CERVICALGIA: ICD-10-CM

## 2024-05-10 PROCEDURE — 99215 OFFICE O/P EST HI 40 MIN: CPT

## 2024-05-10 PROCEDURE — G2211 COMPLEX E/M VISIT ADD ON: CPT

## 2024-05-10 RX ORDER — SERTRALINE HYDROCHLORIDE 50 MG/1
50 TABLET, FILM COATED ORAL
Qty: 90 | Refills: 0 | Status: ACTIVE | COMMUNITY
Start: 2023-07-20 | End: 1900-01-01

## 2024-06-18 ENCOUNTER — RX RENEWAL (OUTPATIENT)
Age: 79
End: 2024-06-18

## 2024-06-18 RX ORDER — PANTOPRAZOLE 40 MG/1
40 TABLET, DELAYED RELEASE ORAL DAILY
Qty: 90 | Refills: 1 | Status: ACTIVE | COMMUNITY
Start: 2018-10-16 | End: 1900-01-01

## 2024-09-29 NOTE — H&P ADULT - NSHPLABSRESULTS_GEN_ALL_CORE
If you have any questions about your discharge instructions and cannot reach your provider please call [facility] [place] nurse's station at [phone].    Wound Care:  You have internal sutures inside of your mouth. These will dissolve on their own. Continue to use peridex mouth rinse after meals.    noted on [place], including lab results and radiology studies  Notably Na 116 and K 2.9

## 2024-11-14 ENCOUNTER — NON-APPOINTMENT (OUTPATIENT)
Age: 79
End: 2024-11-14

## 2024-11-14 ENCOUNTER — APPOINTMENT (OUTPATIENT)
Dept: INTERNAL MEDICINE | Facility: CLINIC | Age: 79
End: 2024-11-14
Payer: MEDICARE

## 2024-11-14 VITALS
SYSTOLIC BLOOD PRESSURE: 160 MMHG | RESPIRATION RATE: 15 BRPM | WEIGHT: 150 LBS | DIASTOLIC BLOOD PRESSURE: 94 MMHG | TEMPERATURE: 98 F | OXYGEN SATURATION: 97 % | HEART RATE: 82 BPM | HEIGHT: 60 IN | BODY MASS INDEX: 29.45 KG/M2

## 2024-11-14 VITALS — DIASTOLIC BLOOD PRESSURE: 82 MMHG | SYSTOLIC BLOOD PRESSURE: 130 MMHG

## 2024-11-14 DIAGNOSIS — Z23 ENCOUNTER FOR IMMUNIZATION: ICD-10-CM

## 2024-11-14 DIAGNOSIS — M54.2 CERVICALGIA: ICD-10-CM

## 2024-11-14 DIAGNOSIS — Z00.00 ENCOUNTER FOR GENERAL ADULT MEDICAL EXAMINATION W/OUT ABNORMAL FINDINGS: ICD-10-CM

## 2024-11-14 DIAGNOSIS — F32.A DEPRESSION, UNSPECIFIED: ICD-10-CM

## 2024-11-14 DIAGNOSIS — I10 ESSENTIAL (PRIMARY) HYPERTENSION: ICD-10-CM

## 2024-11-14 DIAGNOSIS — E78.00 PURE HYPERCHOLESTEROLEMIA, UNSPECIFIED: ICD-10-CM

## 2024-11-14 PROCEDURE — 90662 IIV NO PRSV INCREASED AG IM: CPT

## 2024-11-14 PROCEDURE — 93000 ELECTROCARDIOGRAM COMPLETE: CPT

## 2024-11-14 PROCEDURE — G0439: CPT

## 2024-11-14 PROCEDURE — G0008: CPT

## 2024-11-14 PROCEDURE — 99214 OFFICE O/P EST MOD 30 MIN: CPT | Mod: 25

## 2024-11-14 PROCEDURE — 36415 COLL VENOUS BLD VENIPUNCTURE: CPT

## 2024-11-16 LAB
25(OH)D3 SERPL-MCNC: 54.4 NG/ML
ALBUMIN SERPL ELPH-MCNC: 4.5 G/DL
ALP BLD-CCNC: 80 U/L
ALT SERPL-CCNC: 10 U/L
ANION GAP SERPL CALC-SCNC: 14 MMOL/L
AST SERPL-CCNC: 14 U/L
BILIRUB SERPL-MCNC: 0.2 MG/DL
BUN SERPL-MCNC: 19 MG/DL
CALCIUM SERPL-MCNC: 9.6 MG/DL
CHLORIDE SERPL-SCNC: 102 MMOL/L
CHOLEST SERPL-MCNC: 224 MG/DL
CO2 SERPL-SCNC: 25 MMOL/L
CREAT SERPL-MCNC: 0.72 MG/DL
EGFR: 84 ML/MIN/1.73M2
ESTIMATED AVERAGE GLUCOSE: 111 MG/DL
GLUCOSE SERPL-MCNC: 144 MG/DL
HBA1C MFR BLD HPLC: 5.5 %
HCT VFR BLD CALC: 42.5 %
HDLC SERPL-MCNC: 76 MG/DL
HGB BLD-MCNC: 13.8 G/DL
LDLC SERPL CALC-MCNC: 116 MG/DL
MCHC RBC-ENTMCNC: 31 PG
MCHC RBC-ENTMCNC: 32.5 G/DL
MCV RBC AUTO: 95.5 FL
NONHDLC SERPL-MCNC: 147 MG/DL
PLATELET # BLD AUTO: 265 K/UL
POTASSIUM SERPL-SCNC: 4.4 MMOL/L
PROT SERPL-MCNC: 7.6 G/DL
RBC # BLD: 4.45 M/UL
RBC # FLD: 12.4 %
SODIUM SERPL-SCNC: 141 MMOL/L
TRIGL SERPL-MCNC: 183 MG/DL
TSH SERPL-ACNC: 1.39 UIU/ML
WBC # FLD AUTO: 7.14 K/UL

## 2024-12-16 ENCOUNTER — RX RENEWAL (OUTPATIENT)
Age: 79
End: 2024-12-16

## 2025-02-25 ENCOUNTER — APPOINTMENT (OUTPATIENT)
Dept: INTERNAL MEDICINE | Facility: CLINIC | Age: 80
End: 2025-02-25
Payer: MEDICARE

## 2025-02-25 DIAGNOSIS — F32.A DEPRESSION, UNSPECIFIED: ICD-10-CM

## 2025-02-25 DIAGNOSIS — M54.2 CERVICALGIA: ICD-10-CM

## 2025-02-25 DIAGNOSIS — I10 ESSENTIAL (PRIMARY) HYPERTENSION: ICD-10-CM

## 2025-02-25 PROCEDURE — G2211 COMPLEX E/M VISIT ADD ON: CPT | Mod: 2W

## 2025-02-25 PROCEDURE — 99214 OFFICE O/P EST MOD 30 MIN: CPT | Mod: 2W

## 2025-03-14 ENCOUNTER — RX RENEWAL (OUTPATIENT)
Age: 80
End: 2025-03-14

## 2025-03-19 ENCOUNTER — APPOINTMENT (OUTPATIENT)
Dept: ORTHOPEDIC SURGERY | Facility: CLINIC | Age: 80
End: 2025-03-19
Payer: MEDICARE

## 2025-03-19 VITALS
WEIGHT: 150 LBS | HEIGHT: 60 IN | BODY MASS INDEX: 29.45 KG/M2 | DIASTOLIC BLOOD PRESSURE: 84 MMHG | SYSTOLIC BLOOD PRESSURE: 132 MMHG

## 2025-03-19 DIAGNOSIS — M51.26 OTHER INTERVERTEBRAL DISC DISPLACEMENT, LUMBAR REGION: ICD-10-CM

## 2025-03-19 PROCEDURE — 99204 OFFICE O/P NEW MOD 45 MIN: CPT

## 2025-03-19 PROCEDURE — 72082 X-RAY EXAM ENTIRE SPI 2/3 VW: CPT

## 2025-03-19 RX ORDER — MELOXICAM 15 MG/1
15 TABLET ORAL DAILY
Qty: 25 | Refills: 0 | Status: ACTIVE | COMMUNITY
Start: 2025-03-19 | End: 1900-01-01

## 2025-03-19 RX ORDER — TIZANIDINE 2 MG/1
2 TABLET ORAL
Qty: 24 | Refills: 0 | Status: ACTIVE | COMMUNITY
Start: 2025-03-19 | End: 1900-01-01

## 2025-04-05 ENCOUNTER — APPOINTMENT (OUTPATIENT)
Dept: MRI IMAGING | Facility: IMAGING CENTER | Age: 80
End: 2025-04-05

## 2025-04-07 ENCOUNTER — OUTPATIENT (OUTPATIENT)
Dept: OUTPATIENT SERVICES | Facility: HOSPITAL | Age: 80
LOS: 1 days | End: 2025-04-07
Payer: COMMERCIAL

## 2025-04-07 ENCOUNTER — APPOINTMENT (OUTPATIENT)
Dept: MRI IMAGING | Facility: IMAGING CENTER | Age: 80
End: 2025-04-07
Payer: MEDICARE

## 2025-04-07 DIAGNOSIS — M51.26 OTHER INTERVERTEBRAL DISC DISPLACEMENT, LUMBAR REGION: ICD-10-CM

## 2025-04-07 PROCEDURE — 72141 MRI NECK SPINE W/O DYE: CPT

## 2025-04-07 PROCEDURE — 72141 MRI NECK SPINE W/O DYE: CPT | Mod: 26

## 2025-05-20 ENCOUNTER — APPOINTMENT (OUTPATIENT)
Dept: INTERNAL MEDICINE | Facility: CLINIC | Age: 80
End: 2025-05-20
Payer: MEDICARE

## 2025-05-20 DIAGNOSIS — I10 ESSENTIAL (PRIMARY) HYPERTENSION: ICD-10-CM

## 2025-05-20 DIAGNOSIS — R19.7 DIARRHEA, UNSPECIFIED: ICD-10-CM

## 2025-05-20 DIAGNOSIS — F32.A DEPRESSION, UNSPECIFIED: ICD-10-CM

## 2025-05-20 PROCEDURE — G2211 COMPLEX E/M VISIT ADD ON: CPT | Mod: 2W

## 2025-05-20 PROCEDURE — 99214 OFFICE O/P EST MOD 30 MIN: CPT | Mod: 2W

## 2025-05-21 PROBLEM — R19.7 DIARRHEA: Status: ACTIVE | Noted: 2025-05-21

## 2025-05-30 ENCOUNTER — APPOINTMENT (OUTPATIENT)
Dept: GASTROENTEROLOGY | Facility: CLINIC | Age: 80
End: 2025-05-30
Payer: MEDICARE

## 2025-05-30 ENCOUNTER — LABORATORY RESULT (OUTPATIENT)
Age: 80
End: 2025-05-30

## 2025-05-30 VITALS
HEIGHT: 60 IN | WEIGHT: 143 LBS | RESPIRATION RATE: 14 BRPM | HEART RATE: 80 BPM | DIASTOLIC BLOOD PRESSURE: 70 MMHG | SYSTOLIC BLOOD PRESSURE: 120 MMHG | OXYGEN SATURATION: 99 % | TEMPERATURE: 98 F | BODY MASS INDEX: 28.07 KG/M2

## 2025-05-30 DIAGNOSIS — R00.2 PALPITATIONS: ICD-10-CM

## 2025-05-30 DIAGNOSIS — Z86.0100 PERSONAL HISTORY OF COLON POLYPS, UNSPECIFIED: ICD-10-CM

## 2025-05-30 DIAGNOSIS — K52.9 NONINFECTIVE GASTROENTERITIS AND COLITIS, UNSPECIFIED: ICD-10-CM

## 2025-05-30 PROCEDURE — G2211 COMPLEX E/M VISIT ADD ON: CPT

## 2025-05-30 PROCEDURE — 36415 COLL VENOUS BLD VENIPUNCTURE: CPT

## 2025-05-30 PROCEDURE — 99204 OFFICE O/P NEW MOD 45 MIN: CPT

## 2025-05-31 LAB
ALBUMIN SERPL ELPH-MCNC: 4.3 G/DL
ALP BLD-CCNC: 77 U/L
ALT SERPL-CCNC: 13 U/L
AMYLASE/CREAT SERPL: 70 U/L
ANION GAP SERPL CALC-SCNC: 14 MMOL/L
AST SERPL-CCNC: 17 U/L
BASOPHILS # BLD AUTO: 0.01 K/UL
BASOPHILS NFR BLD AUTO: 0.1 %
BILIRUB SERPL-MCNC: 0.2 MG/DL
BUN SERPL-MCNC: 14 MG/DL
CALCIUM SERPL-MCNC: 9.6 MG/DL
CANCER AG19-9 SERPL-ACNC: 42 U/ML
CEA SERPL-MCNC: 9 NG/ML
CHLORIDE SERPL-SCNC: 104 MMOL/L
CO2 SERPL-SCNC: 24 MMOL/L
CREAT SERPL-MCNC: 0.7 MG/DL
CRP SERPL-MCNC: 6 MG/L
EGFRCR SERPLBLD CKD-EPI 2021: 87 ML/MIN/1.73M2
EOSINOPHIL # BLD AUTO: 0.02 K/UL
EOSINOPHIL NFR BLD AUTO: 0.3 %
ERYTHROCYTE [SEDIMENTATION RATE] IN BLOOD BY WESTERGREN METHOD: 27 MM/HR
GLIADIN IGA SER QL: 0.6 U/ML
GLIADIN IGG SER QL: <0.4 U/ML
GLIADIN PEPTIDE IGA SER-ACNC: NEGATIVE
GLIADIN PEPTIDE IGG SER-ACNC: NEGATIVE
GLUCOSE SERPL-MCNC: 128 MG/DL
HCT VFR BLD CALC: 41.7 %
HGB BLD-MCNC: 13 G/DL
IMM GRANULOCYTES NFR BLD AUTO: 0.4 %
LPL SERPL-CCNC: 33 U/L
LYMPHOCYTES # BLD AUTO: 2.05 K/UL
LYMPHOCYTES NFR BLD AUTO: 26.4 %
MAN DIFF?: NORMAL
MCHC RBC-ENTMCNC: 30.3 PG
MCHC RBC-ENTMCNC: 31.2 G/DL
MCV RBC AUTO: 97.2 FL
MONOCYTES # BLD AUTO: 0.77 K/UL
MONOCYTES NFR BLD AUTO: 9.9 %
NEUTROPHILS # BLD AUTO: 4.88 K/UL
NEUTROPHILS NFR BLD AUTO: 62.9 %
PLATELET # BLD AUTO: 262 K/UL
POTASSIUM SERPL-SCNC: 4.4 MMOL/L
PROT SERPL-MCNC: 7.2 G/DL
RBC # BLD: 4.29 M/UL
RBC # FLD: 13.7 %
SODIUM SERPL-SCNC: 142 MMOL/L
T3RU NFR SERPL: 1 TBI
T4 FREE SERPL-MCNC: 1.1 NG/DL
TSH SERPL-ACNC: 1.08 UIU/ML
TTG IGA SER IA-ACNC: <0.5 U/ML
TTG IGA SER-ACNC: NEGATIVE
TTG IGG SER IA-ACNC: 1 U/ML
TTG IGG SER IA-ACNC: NEGATIVE
WBC # FLD AUTO: 7.76 K/UL

## 2025-06-02 LAB
ENDOMYSIUM IGA SER QL: NEGATIVE
ENDOMYSIUM IGA TITR SER: NORMAL

## 2025-06-05 LAB
GI PCR PANEL: ABNORMAL
NOROVIRUS GI/GII: ABNORMAL

## 2025-06-08 LAB
C DIFF TOXIN B QL PCR REFLEX: NORMAL
GDH ANTIGEN: NOT DETECTED
TOXIN A AND B: NOT DETECTED

## 2025-06-10 ENCOUNTER — APPOINTMENT (OUTPATIENT)
Dept: GASTROENTEROLOGY | Facility: CLINIC | Age: 80
End: 2025-06-10
Payer: MEDICARE

## 2025-06-10 VITALS
SYSTOLIC BLOOD PRESSURE: 112 MMHG | HEART RATE: 60 BPM | WEIGHT: 143 LBS | HEIGHT: 60 IN | RESPIRATION RATE: 14 BRPM | BODY MASS INDEX: 28.07 KG/M2 | DIASTOLIC BLOOD PRESSURE: 70 MMHG | OXYGEN SATURATION: 95 %

## 2025-06-10 PROBLEM — R97.8 ELEVATED CA 19-9 LEVEL: Status: ACTIVE | Noted: 2025-06-10

## 2025-06-10 PROBLEM — R15.2 FECAL URGENCY: Status: ACTIVE | Noted: 2025-06-10

## 2025-06-10 PROBLEM — R97.0 ELEVATED CEA: Status: ACTIVE | Noted: 2025-06-10

## 2025-06-10 PROCEDURE — G2211 COMPLEX E/M VISIT ADD ON: CPT

## 2025-06-10 PROCEDURE — 99214 OFFICE O/P EST MOD 30 MIN: CPT

## 2025-06-10 RX ORDER — SODIUM PICOSULFATE, MAGNESIUM OXIDE, AND ANHYDROUS CITRIC ACID 12; 3.5; 1 G/175ML; G/175ML; MG/175ML
10-3.5-12 MG-GM LIQUID ORAL TWICE DAILY
Qty: 2 | Refills: 0 | Status: ACTIVE | COMMUNITY
Start: 2025-06-10 | End: 1900-01-01

## 2025-06-11 ENCOUNTER — APPOINTMENT (OUTPATIENT)
Dept: ORTHOPEDIC SURGERY | Facility: CLINIC | Age: 80
End: 2025-06-11
Payer: MEDICARE

## 2025-06-11 PROCEDURE — 99214 OFFICE O/P EST MOD 30 MIN: CPT | Mod: 93

## 2025-07-05 ENCOUNTER — APPOINTMENT (OUTPATIENT)
Dept: CT IMAGING | Facility: IMAGING CENTER | Age: 80
End: 2025-07-05

## 2025-07-05 ENCOUNTER — OUTPATIENT (OUTPATIENT)
Dept: OUTPATIENT SERVICES | Facility: HOSPITAL | Age: 80
LOS: 1 days | End: 2025-07-05
Payer: COMMERCIAL

## 2025-07-05 DIAGNOSIS — K52.9 NONINFECTIVE GASTROENTERITIS AND COLITIS, UNSPECIFIED: ICD-10-CM

## 2025-07-05 PROCEDURE — 74177 CT ABD & PELVIS W/CONTRAST: CPT | Mod: 26

## 2025-07-05 PROCEDURE — 74177 CT ABD & PELVIS W/CONTRAST: CPT

## 2025-07-11 ENCOUNTER — RESULT REVIEW (OUTPATIENT)
Age: 80
End: 2025-07-11

## 2025-07-11 ENCOUNTER — APPOINTMENT (OUTPATIENT)
Dept: GASTROENTEROLOGY | Facility: AMBULATORY SURGERY CENTER | Age: 80
End: 2025-07-11
Payer: MEDICARE

## 2025-07-11 PROCEDURE — 45380 COLONOSCOPY AND BIOPSY: CPT

## 2025-09-01 ENCOUNTER — INPATIENT (INPATIENT)
Facility: HOSPITAL | Age: 80
LOS: 3 days | Discharge: NURSING, REHAB OR LONG-TERM CARE | DRG: 536 | End: 2025-09-05
Attending: INTERNAL MEDICINE | Admitting: HOSPITALIST
Payer: COMMERCIAL

## 2025-09-01 VITALS
HEART RATE: 67 BPM | RESPIRATION RATE: 19 BRPM | OXYGEN SATURATION: 93 % | WEIGHT: 139.99 LBS | TEMPERATURE: 98 F | DIASTOLIC BLOOD PRESSURE: 59 MMHG | SYSTOLIC BLOOD PRESSURE: 119 MMHG
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
## 2025-09-01 DIAGNOSIS — I10 ESSENTIAL (PRIMARY) HYPERTENSION: ICD-10-CM

## 2025-09-01 DIAGNOSIS — K21.9 GASTRO-ESOPHAGEAL REFLUX DISEASE WITHOUT ESOPHAGITIS: ICD-10-CM

## 2025-09-01 DIAGNOSIS — S32.9XXA FRACTURE OF UNSPECIFIED PARTS OF LUMBOSACRAL SPINE AND PELVIS, INITIAL ENCOUNTER FOR CLOSED FRACTURE: ICD-10-CM

## 2025-09-01 DIAGNOSIS — Z29.9 ENCOUNTER FOR PROPHYLACTIC MEASURES, UNSPECIFIED: ICD-10-CM

## 2025-09-01 DIAGNOSIS — T14.8XXA OTHER INJURY OF UNSPECIFIED BODY REGION, INITIAL ENCOUNTER: ICD-10-CM

## 2025-09-01 LAB
ALBUMIN SERPL ELPH-MCNC: 3.9 G/DL — SIGNIFICANT CHANGE UP (ref 3.3–5)
ALP SERPL-CCNC: 105 U/L — SIGNIFICANT CHANGE UP (ref 40–120)
ALT FLD-CCNC: 10 U/L — SIGNIFICANT CHANGE UP (ref 10–45)
ANION GAP SERPL CALC-SCNC: 19 MMOL/L — HIGH (ref 5–17)
APPEARANCE UR: CLEAR — SIGNIFICANT CHANGE UP
AST SERPL-CCNC: 13 U/L — SIGNIFICANT CHANGE UP (ref 10–40)
BACTERIA # UR AUTO: ABNORMAL /HPF
BILIRUB SERPL-MCNC: 0.3 MG/DL — SIGNIFICANT CHANGE UP (ref 0.2–1.2)
BILIRUB UR-MCNC: NEGATIVE — SIGNIFICANT CHANGE UP
BUN SERPL-MCNC: 14 MG/DL — SIGNIFICANT CHANGE UP (ref 7–23)
CALCIUM SERPL-MCNC: 9.5 MG/DL — SIGNIFICANT CHANGE UP (ref 8.4–10.5)
CAST: 1 /LPF — SIGNIFICANT CHANGE UP (ref 0–4)
CHLORIDE SERPL-SCNC: 102 MMOL/L — SIGNIFICANT CHANGE UP (ref 96–108)
CO2 SERPL-SCNC: 20 MMOL/L — LOW (ref 22–31)
COLOR SPEC: YELLOW — SIGNIFICANT CHANGE UP
CREAT SERPL-MCNC: 0.61 MG/DL — SIGNIFICANT CHANGE UP (ref 0.5–1.3)
DIFF PNL FLD: NEGATIVE — SIGNIFICANT CHANGE UP
EGFR: 90 ML/MIN/1.73M2 — SIGNIFICANT CHANGE UP
EGFR: 90 ML/MIN/1.73M2 — SIGNIFICANT CHANGE UP
ETHANOL SERPL-MCNC: <10 MG/DL — SIGNIFICANT CHANGE UP (ref 0–10)
GLUCOSE SERPL-MCNC: 113 MG/DL — HIGH (ref 70–99)
GLUCOSE UR QL: NEGATIVE MG/DL — SIGNIFICANT CHANGE UP
HCT VFR BLD CALC: 38.5 % — SIGNIFICANT CHANGE UP (ref 34.5–45)
HGB BLD-MCNC: 12.3 G/DL — SIGNIFICANT CHANGE UP (ref 11.5–15.5)
KETONES UR QL: NEGATIVE MG/DL — SIGNIFICANT CHANGE UP
LEUKOCYTE ESTERASE UR-ACNC: ABNORMAL
MCHC RBC-ENTMCNC: 30.8 PG — SIGNIFICANT CHANGE UP (ref 27–34)
MCHC RBC-ENTMCNC: 31.9 G/DL — LOW (ref 32–36)
MCV RBC AUTO: 96.5 FL — SIGNIFICANT CHANGE UP (ref 80–100)
NITRITE UR-MCNC: NEGATIVE — SIGNIFICANT CHANGE UP
NRBC # BLD AUTO: 0 K/UL — SIGNIFICANT CHANGE UP (ref 0–0)
NRBC # FLD: 0 K/UL — SIGNIFICANT CHANGE UP (ref 0–0)
NRBC BLD AUTO-RTO: 0 /100 WBCS — SIGNIFICANT CHANGE UP (ref 0–0)
PH UR: 5 — SIGNIFICANT CHANGE UP (ref 5–8)
PLATELET # BLD AUTO: 317 K/UL — SIGNIFICANT CHANGE UP (ref 150–400)
PMV BLD: 9.9 FL — SIGNIFICANT CHANGE UP (ref 7–13)
POTASSIUM SERPL-MCNC: 3.7 MMOL/L — SIGNIFICANT CHANGE UP (ref 3.5–5.3)
POTASSIUM SERPL-SCNC: 3.7 MMOL/L — SIGNIFICANT CHANGE UP (ref 3.5–5.3)
PROT SERPL-MCNC: 7 G/DL — SIGNIFICANT CHANGE UP (ref 6–8.3)
PROT UR-MCNC: NEGATIVE MG/DL — SIGNIFICANT CHANGE UP
RBC # BLD: 3.99 M/UL — SIGNIFICANT CHANGE UP (ref 3.8–5.2)
RBC # FLD: 13.1 % — SIGNIFICANT CHANGE UP (ref 10.3–14.5)
RBC CASTS # UR COMP ASSIST: 0 /HPF — SIGNIFICANT CHANGE UP (ref 0–4)
SODIUM SERPL-SCNC: 141 MMOL/L — SIGNIFICANT CHANGE UP (ref 135–145)
SP GR SPEC: 1.01 — SIGNIFICANT CHANGE UP (ref 1–1.03)
SQUAMOUS # UR AUTO: 2 /HPF — SIGNIFICANT CHANGE UP (ref 0–5)
TROPONIN T, HIGH SENSITIVITY RESULT: 9 NG/L — SIGNIFICANT CHANGE UP
UROBILINOGEN FLD QL: 0.2 MG/DL — SIGNIFICANT CHANGE UP (ref 0.2–1)
WBC # BLD: 15.14 K/UL — HIGH (ref 3.8–10.5)
WBC # FLD AUTO: 15.14 K/UL — HIGH (ref 3.8–10.5)
WBC UR QL: 11 /HPF — HIGH (ref 0–5)

## 2025-09-01 PROCEDURE — 93010 ELECTROCARDIOGRAM REPORT: CPT

## 2025-09-01 PROCEDURE — 71045 X-RAY EXAM CHEST 1 VIEW: CPT

## 2025-09-01 PROCEDURE — 76377 3D RENDER W/INTRP POSTPROCES: CPT | Mod: 26

## 2025-09-01 PROCEDURE — 73502 X-RAY EXAM HIP UNI 2-3 VIEWS: CPT | Mod: 26,RT

## 2025-09-01 PROCEDURE — 72190 X-RAY EXAM OF PELVIS: CPT

## 2025-09-01 PROCEDURE — 70450 CT HEAD/BRAIN W/O DYE: CPT | Mod: 26

## 2025-09-01 PROCEDURE — 70450 CT HEAD/BRAIN W/O DYE: CPT

## 2025-09-01 PROCEDURE — 72190 X-RAY EXAM OF PELVIS: CPT | Mod: 26,XE

## 2025-09-01 PROCEDURE — 72192 CT PELVIS W/O DYE: CPT | Mod: 26

## 2025-09-01 PROCEDURE — 99223 1ST HOSP IP/OBS HIGH 75: CPT

## 2025-09-01 PROCEDURE — 76377 3D RENDER W/INTRP POSTPROCES: CPT

## 2025-09-01 PROCEDURE — 72192 CT PELVIS W/O DYE: CPT

## 2025-09-01 PROCEDURE — 99285 EMERGENCY DEPT VISIT HI MDM: CPT

## 2025-09-01 PROCEDURE — 73502 X-RAY EXAM HIP UNI 2-3 VIEWS: CPT

## 2025-09-01 PROCEDURE — 80307 DRUG TEST PRSMV CHEM ANLYZR: CPT

## 2025-09-01 PROCEDURE — 71045 X-RAY EXAM CHEST 1 VIEW: CPT | Mod: 26

## 2025-09-01 PROCEDURE — 84484 ASSAY OF TROPONIN QUANT: CPT

## 2025-09-01 PROCEDURE — 85027 COMPLETE CBC AUTOMATED: CPT

## 2025-09-01 PROCEDURE — 81001 URINALYSIS AUTO W/SCOPE: CPT

## 2025-09-01 PROCEDURE — 80053 COMPREHEN METABOLIC PANEL: CPT

## 2025-09-01 PROCEDURE — 73552 X-RAY EXAM OF FEMUR 2/>: CPT | Mod: 26,RT

## 2025-09-01 PROCEDURE — 36415 COLL VENOUS BLD VENIPUNCTURE: CPT

## 2025-09-01 PROCEDURE — 73552 X-RAY EXAM OF FEMUR 2/>: CPT

## 2025-09-01 PROCEDURE — 99221 1ST HOSP IP/OBS SF/LOW 40: CPT

## 2025-09-01 RX ORDER — ACETAMINOPHEN 500 MG/5ML
650 LIQUID (ML) ORAL EVERY 6 HOURS
Refills: 0 | Status: DISCONTINUED | OUTPATIENT
Start: 2025-09-01 | End: 2025-09-03

## 2025-09-01 RX ORDER — AMLODIPINE BESYLATE 10 MG/1
1 TABLET ORAL
Refills: 0 | DISCHARGE

## 2025-09-01 RX ORDER — FOSINOPRIL SODIUM 20 MG/1
1 TABLET ORAL
Refills: 0 | DISCHARGE

## 2025-09-01 RX ORDER — ACETAMINOPHEN 500 MG/5ML
1000 LIQUID (ML) ORAL ONCE
Refills: 0 | Status: COMPLETED | OUTPATIENT
Start: 2025-09-01 | End: 2025-09-01

## 2025-09-01 RX ORDER — SERTRALINE 100 MG/1
1 TABLET, FILM COATED ORAL
Refills: 0 | DISCHARGE

## 2025-09-01 RX ORDER — MELATONIN 5 MG
3 TABLET ORAL AT BEDTIME
Refills: 0 | Status: DISCONTINUED | OUTPATIENT
Start: 2025-09-01 | End: 2025-09-05

## 2025-09-01 RX ORDER — METOPROLOL SUCCINATE 50 MG/1
1 TABLET, EXTENDED RELEASE ORAL
Refills: 0 | DISCHARGE

## 2025-09-01 RX ORDER — AMLODIPINE BESYLATE 10 MG/1
10 TABLET ORAL DAILY
Refills: 0 | Status: DISCONTINUED | OUTPATIENT
Start: 2025-09-01 | End: 2025-09-05

## 2025-09-01 RX ORDER — KETOROLAC TROMETHAMINE 30 MG/ML
15 INJECTION, SOLUTION INTRAMUSCULAR; INTRAVENOUS EVERY 6 HOURS
Refills: 0 | Status: DISCONTINUED | OUTPATIENT
Start: 2025-09-01 | End: 2025-09-02

## 2025-09-01 RX ORDER — CYST/ALA/Q10/PHOS.SER/DHA/BROC 100-20-50
1 POWDER (GRAM) ORAL
Refills: 0 | DISCHARGE

## 2025-09-01 RX ORDER — LISINOPRIL 5 MG/1
40 TABLET ORAL DAILY
Refills: 0 | Status: DISCONTINUED | OUTPATIENT
Start: 2025-09-01 | End: 2025-09-05

## 2025-09-01 RX ORDER — INFLUENZA A VIRUS A/IDAHO/07/2018 (H1N1) ANTIGEN (MDCK CELL DERIVED, PROPIOLACTONE INACTIVATED, INFLUENZA A VIRUS A/INDIANA/08/2018 (H3N2) ANTIGEN (MDCK CELL DERIVED, PROPIOLACTONE INACTIVATED), INFLUENZA B VIRUS B/SINGAPORE/INFTT-16-0610/2016 ANTIGEN (MDCK CELL DERIVED, PROPIOLACTONE INACTIVATED), INFLUENZA B VIRUS B/IOWA/06/2017 ANTIGEN (MDCK CELL DERIVED, PROPIOLACTONE INACTIVATED) 15; 15; 15; 15 UG/.5ML; UG/.5ML; UG/.5ML; UG/.5ML
0.5 INJECTION, SUSPENSION INTRAMUSCULAR ONCE
Refills: 0 | Status: DISCONTINUED | OUTPATIENT
Start: 2025-09-01 | End: 2025-09-05

## 2025-09-01 RX ORDER — MAGNESIUM, ALUMINUM HYDROXIDE 200-200 MG
30 TABLET,CHEWABLE ORAL EVERY 4 HOURS
Refills: 0 | Status: DISCONTINUED | OUTPATIENT
Start: 2025-09-01 | End: 2025-09-05

## 2025-09-01 RX ORDER — LANOLIN/MINERAL OIL/PETROLATUM
1 OINTMENT (GRAM) OPHTHALMIC (EYE)
Refills: 0 | DISCHARGE

## 2025-09-01 RX ORDER — ERGOCALCIFEROL 1.25 MG/1
CAPSULE ORAL
Refills: 0 | DISCHARGE

## 2025-09-01 RX ORDER — ACETAMINOPHEN 500 MG/5ML
2 LIQUID (ML) ORAL
Refills: 0 | DISCHARGE

## 2025-09-01 RX ORDER — METOPROLOL SUCCINATE 50 MG/1
50 TABLET, EXTENDED RELEASE ORAL
Refills: 0 | Status: DISCONTINUED | OUTPATIENT
Start: 2025-09-01 | End: 2025-09-05

## 2025-09-01 RX ORDER — ACETAMINOPHEN 500 MG/5ML
650 LIQUID (ML) ORAL ONCE
Refills: 0 | Status: COMPLETED | OUTPATIENT
Start: 2025-09-01 | End: 2025-09-01

## 2025-09-01 RX ORDER — LANOLIN/MINERAL OIL/PETROLATUM
1 OINTMENT (GRAM) OPHTHALMIC (EYE) DAILY
Refills: 0 | Status: DISCONTINUED | OUTPATIENT
Start: 2025-09-01 | End: 2025-09-05

## 2025-09-01 RX ORDER — ONDANSETRON HCL/PF 4 MG/2 ML
4 VIAL (ML) INJECTION EVERY 8 HOURS
Refills: 0 | Status: DISCONTINUED | OUTPATIENT
Start: 2025-09-01 | End: 2025-09-05

## 2025-09-01 RX ORDER — KETOROLAC TROMETHAMINE 30 MG/ML
15 INJECTION, SOLUTION INTRAMUSCULAR; INTRAVENOUS ONCE
Refills: 0 | Status: DISCONTINUED | OUTPATIENT
Start: 2025-09-01 | End: 2025-09-01

## 2025-09-01 RX ORDER — SERTRALINE 100 MG/1
100 TABLET, FILM COATED ORAL DAILY
Refills: 0 | Status: DISCONTINUED | OUTPATIENT
Start: 2025-09-01 | End: 2025-09-05

## 2025-09-01 RX ADMIN — Medication 650 MILLIGRAM(S): at 17:26

## 2025-09-01 RX ADMIN — AMLODIPINE BESYLATE 10 MILLIGRAM(S): 10 TABLET ORAL at 09:30

## 2025-09-01 RX ADMIN — SERTRALINE 100 MILLIGRAM(S): 100 TABLET, FILM COATED ORAL at 11:32

## 2025-09-01 RX ADMIN — LISINOPRIL 40 MILLIGRAM(S): 5 TABLET ORAL at 09:30

## 2025-09-01 RX ADMIN — Medication 40 MILLIGRAM(S): at 09:30

## 2025-09-01 RX ADMIN — Medication 650 MILLIGRAM(S): at 13:55

## 2025-09-01 RX ADMIN — Medication 1000 MILLIGRAM(S): at 07:08

## 2025-09-01 RX ADMIN — METOPROLOL SUCCINATE 50 MILLIGRAM(S): 50 TABLET, EXTENDED RELEASE ORAL at 17:27

## 2025-09-01 RX ADMIN — Medication 400 MILLIGRAM(S): at 06:49

## 2025-09-01 RX ADMIN — KETOROLAC TROMETHAMINE 15 MILLIGRAM(S): 30 INJECTION, SOLUTION INTRAMUSCULAR; INTRAVENOUS at 22:43

## 2025-09-01 RX ADMIN — KETOROLAC TROMETHAMINE 15 MILLIGRAM(S): 30 INJECTION, SOLUTION INTRAMUSCULAR; INTRAVENOUS at 07:08

## 2025-09-01 RX ADMIN — Medication 3 MILLIGRAM(S): at 21:43

## 2025-09-01 RX ADMIN — KETOROLAC TROMETHAMINE 15 MILLIGRAM(S): 30 INJECTION, SOLUTION INTRAMUSCULAR; INTRAVENOUS at 06:48

## 2025-09-01 RX ADMIN — KETOROLAC TROMETHAMINE 15 MILLIGRAM(S): 30 INJECTION, SOLUTION INTRAMUSCULAR; INTRAVENOUS at 21:43

## 2025-09-02 LAB
24R-OH-CALCIDIOL SERPL-MCNC: 54.5 NG/ML — SIGNIFICANT CHANGE UP
ALBUMIN SERPL ELPH-MCNC: 3.6 G/DL — SIGNIFICANT CHANGE UP (ref 3.3–5)
ALP SERPL-CCNC: 106 U/L — SIGNIFICANT CHANGE UP (ref 40–120)
ALT FLD-CCNC: 8 U/L — LOW (ref 10–45)
ANION GAP SERPL CALC-SCNC: 15 MMOL/L — SIGNIFICANT CHANGE UP (ref 5–17)
AST SERPL-CCNC: 16 U/L — SIGNIFICANT CHANGE UP (ref 10–40)
BILIRUB SERPL-MCNC: 0.5 MG/DL — SIGNIFICANT CHANGE UP (ref 0.2–1.2)
BUN SERPL-MCNC: 8 MG/DL — SIGNIFICANT CHANGE UP (ref 7–23)
CALCIUM SERPL-MCNC: 9 MG/DL — SIGNIFICANT CHANGE UP (ref 8.4–10.5)
CHLORIDE SERPL-SCNC: 105 MMOL/L — SIGNIFICANT CHANGE UP (ref 96–108)
CO2 SERPL-SCNC: 23 MMOL/L — SIGNIFICANT CHANGE UP (ref 22–31)
CREAT SERPL-MCNC: 0.37 MG/DL — LOW (ref 0.5–1.3)
EGFR: 101 ML/MIN/1.73M2 — SIGNIFICANT CHANGE UP
EGFR: 101 ML/MIN/1.73M2 — SIGNIFICANT CHANGE UP
GLUCOSE SERPL-MCNC: 113 MG/DL — HIGH (ref 70–99)
HCT VFR BLD CALC: 39.8 % — SIGNIFICANT CHANGE UP (ref 34.5–45)
HGB BLD-MCNC: 12.9 G/DL — SIGNIFICANT CHANGE UP (ref 11.5–15.5)
MCHC RBC-ENTMCNC: 30.9 PG — SIGNIFICANT CHANGE UP (ref 27–34)
MCHC RBC-ENTMCNC: 32.4 G/DL — SIGNIFICANT CHANGE UP (ref 32–36)
MCV RBC AUTO: 95.2 FL — SIGNIFICANT CHANGE UP (ref 80–100)
NRBC # BLD AUTO: 0 K/UL — SIGNIFICANT CHANGE UP (ref 0–0)
NRBC # FLD: 0 K/UL — SIGNIFICANT CHANGE UP (ref 0–0)
NRBC BLD AUTO-RTO: 0 /100 WBCS — SIGNIFICANT CHANGE UP (ref 0–0)
PLATELET # BLD AUTO: 306 K/UL — SIGNIFICANT CHANGE UP (ref 150–400)
PMV BLD: 10.2 FL — SIGNIFICANT CHANGE UP (ref 7–13)
POTASSIUM SERPL-MCNC: 3.4 MMOL/L — LOW (ref 3.5–5.3)
POTASSIUM SERPL-SCNC: 3.4 MMOL/L — LOW (ref 3.5–5.3)
PROT SERPL-MCNC: 6.5 G/DL — SIGNIFICANT CHANGE UP (ref 6–8.3)
RBC # BLD: 4.18 M/UL — SIGNIFICANT CHANGE UP (ref 3.8–5.2)
RBC # FLD: 13.2 % — SIGNIFICANT CHANGE UP (ref 10.3–14.5)
SODIUM SERPL-SCNC: 143 MMOL/L — SIGNIFICANT CHANGE UP (ref 135–145)
WBC # BLD: 7.92 K/UL — SIGNIFICANT CHANGE UP (ref 3.8–10.5)
WBC # FLD AUTO: 7.92 K/UL — SIGNIFICANT CHANGE UP (ref 3.8–10.5)

## 2025-09-02 PROCEDURE — 82306 VITAMIN D 25 HYDROXY: CPT

## 2025-09-02 PROCEDURE — 71045 X-RAY EXAM CHEST 1 VIEW: CPT

## 2025-09-02 PROCEDURE — 85027 COMPLETE CBC AUTOMATED: CPT

## 2025-09-02 PROCEDURE — 70450 CT HEAD/BRAIN W/O DYE: CPT

## 2025-09-02 PROCEDURE — 73502 X-RAY EXAM HIP UNI 2-3 VIEWS: CPT

## 2025-09-02 PROCEDURE — 76377 3D RENDER W/INTRP POSTPROCES: CPT

## 2025-09-02 PROCEDURE — 97162 PT EVAL MOD COMPLEX 30 MIN: CPT

## 2025-09-02 PROCEDURE — 80053 COMPREHEN METABOLIC PANEL: CPT

## 2025-09-02 PROCEDURE — 80307 DRUG TEST PRSMV CHEM ANLYZR: CPT

## 2025-09-02 PROCEDURE — 81001 URINALYSIS AUTO W/SCOPE: CPT

## 2025-09-02 PROCEDURE — 72190 X-RAY EXAM OF PELVIS: CPT

## 2025-09-02 PROCEDURE — 84484 ASSAY OF TROPONIN QUANT: CPT

## 2025-09-02 PROCEDURE — 73552 X-RAY EXAM OF FEMUR 2/>: CPT

## 2025-09-02 PROCEDURE — 36415 COLL VENOUS BLD VENIPUNCTURE: CPT

## 2025-09-02 PROCEDURE — 99232 SBSQ HOSP IP/OBS MODERATE 35: CPT

## 2025-09-02 PROCEDURE — 72192 CT PELVIS W/O DYE: CPT

## 2025-09-02 RX ORDER — SENNA 187 MG
2 TABLET ORAL AT BEDTIME
Refills: 0 | Status: DISCONTINUED | OUTPATIENT
Start: 2025-09-02 | End: 2025-09-05

## 2025-09-02 RX ORDER — POLYETHYLENE GLYCOL 3350 17 G/17G
17 POWDER, FOR SOLUTION ORAL DAILY
Refills: 0 | Status: DISCONTINUED | OUTPATIENT
Start: 2025-09-02 | End: 2025-09-03

## 2025-09-02 RX ORDER — TRAMADOL HYDROCHLORIDE 50 MG/1
25 TABLET, FILM COATED ORAL EVERY 12 HOURS
Refills: 0 | Status: DISCONTINUED | OUTPATIENT
Start: 2025-09-02 | End: 2025-09-04

## 2025-09-02 RX ORDER — KETOROLAC TROMETHAMINE 30 MG/ML
15 INJECTION, SOLUTION INTRAMUSCULAR; INTRAVENOUS ONCE
Refills: 0 | Status: DISCONTINUED | OUTPATIENT
Start: 2025-09-02 | End: 2025-09-02

## 2025-09-02 RX ADMIN — METOPROLOL SUCCINATE 50 MILLIGRAM(S): 50 TABLET, EXTENDED RELEASE ORAL at 16:57

## 2025-09-02 RX ADMIN — AMLODIPINE BESYLATE 10 MILLIGRAM(S): 10 TABLET ORAL at 05:30

## 2025-09-02 RX ADMIN — KETOROLAC TROMETHAMINE 15 MILLIGRAM(S): 30 INJECTION, SOLUTION INTRAMUSCULAR; INTRAVENOUS at 12:37

## 2025-09-02 RX ADMIN — POLYETHYLENE GLYCOL 3350 17 GRAM(S): 17 POWDER, FOR SOLUTION ORAL at 17:00

## 2025-09-02 RX ADMIN — METOPROLOL SUCCINATE 50 MILLIGRAM(S): 50 TABLET, EXTENDED RELEASE ORAL at 05:30

## 2025-09-02 RX ADMIN — SERTRALINE 100 MILLIGRAM(S): 100 TABLET, FILM COATED ORAL at 08:37

## 2025-09-02 RX ADMIN — Medication 40 MILLIGRAM(S): at 06:13

## 2025-09-02 RX ADMIN — KETOROLAC TROMETHAMINE 15 MILLIGRAM(S): 30 INJECTION, SOLUTION INTRAMUSCULAR; INTRAVENOUS at 05:32

## 2025-09-02 RX ADMIN — TRAMADOL HYDROCHLORIDE 25 MILLIGRAM(S): 50 TABLET, FILM COATED ORAL at 17:00

## 2025-09-02 RX ADMIN — KETOROLAC TROMETHAMINE 15 MILLIGRAM(S): 30 INJECTION, SOLUTION INTRAMUSCULAR; INTRAVENOUS at 22:13

## 2025-09-02 RX ADMIN — Medication 20 MILLIEQUIVALENT(S): at 08:37

## 2025-09-02 RX ADMIN — LISINOPRIL 40 MILLIGRAM(S): 5 TABLET ORAL at 05:31

## 2025-09-03 LAB
POTASSIUM SERPL-MCNC: 3.8 MMOL/L — SIGNIFICANT CHANGE UP (ref 3.5–5.3)
POTASSIUM SERPL-SCNC: 3.8 MMOL/L — SIGNIFICANT CHANGE UP (ref 3.5–5.3)

## 2025-09-03 PROCEDURE — 99231 SBSQ HOSP IP/OBS SF/LOW 25: CPT

## 2025-09-03 RX ORDER — POLYETHYLENE GLYCOL 3350 17 G/17G
17 POWDER, FOR SOLUTION ORAL
Refills: 0 | Status: DISCONTINUED | OUTPATIENT
Start: 2025-09-03 | End: 2025-09-05

## 2025-09-03 RX ORDER — ACETAMINOPHEN 500 MG/5ML
650 LIQUID (ML) ORAL EVERY 6 HOURS
Refills: 0 | Status: DISCONTINUED | OUTPATIENT
Start: 2025-09-03 | End: 2025-09-05

## 2025-09-03 RX ORDER — BISACODYL 5 MG
5 TABLET, DELAYED RELEASE (ENTERIC COATED) ORAL AT BEDTIME
Refills: 0 | Status: DISCONTINUED | OUTPATIENT
Start: 2025-09-03 | End: 2025-09-05

## 2025-09-03 RX ADMIN — LISINOPRIL 40 MILLIGRAM(S): 5 TABLET ORAL at 06:00

## 2025-09-03 RX ADMIN — Medication 40 MILLIGRAM(S): at 06:00

## 2025-09-03 RX ADMIN — SERTRALINE 100 MILLIGRAM(S): 100 TABLET, FILM COATED ORAL at 11:09

## 2025-09-03 RX ADMIN — Medication 3 MILLIGRAM(S): at 21:15

## 2025-09-03 RX ADMIN — Medication 650 MILLIGRAM(S): at 16:14

## 2025-09-03 RX ADMIN — Medication 650 MILLIGRAM(S): at 23:09

## 2025-09-03 RX ADMIN — METOPROLOL SUCCINATE 50 MILLIGRAM(S): 50 TABLET, EXTENDED RELEASE ORAL at 16:14

## 2025-09-03 RX ADMIN — AMLODIPINE BESYLATE 10 MILLIGRAM(S): 10 TABLET ORAL at 06:02

## 2025-09-03 RX ADMIN — METOPROLOL SUCCINATE 50 MILLIGRAM(S): 50 TABLET, EXTENDED RELEASE ORAL at 06:00

## 2025-09-03 RX ADMIN — TRAMADOL HYDROCHLORIDE 25 MILLIGRAM(S): 50 TABLET, FILM COATED ORAL at 11:09

## 2025-09-03 RX ADMIN — Medication 650 MILLIGRAM(S): at 23:35

## 2025-09-03 RX ADMIN — POLYETHYLENE GLYCOL 3350 17 GRAM(S): 17 POWDER, FOR SOLUTION ORAL at 16:14

## 2025-09-04 PROCEDURE — 99232 SBSQ HOSP IP/OBS MODERATE 35: CPT

## 2025-09-04 RX ORDER — TRAMADOL HYDROCHLORIDE 50 MG/1
50 TABLET, FILM COATED ORAL EVERY 12 HOURS
Refills: 0 | Status: DISCONTINUED | OUTPATIENT
Start: 2025-09-04 | End: 2025-09-05

## 2025-09-04 RX ADMIN — LISINOPRIL 40 MILLIGRAM(S): 5 TABLET ORAL at 05:22

## 2025-09-04 RX ADMIN — METOPROLOL SUCCINATE 50 MILLIGRAM(S): 50 TABLET, EXTENDED RELEASE ORAL at 05:21

## 2025-09-04 RX ADMIN — Medication 650 MILLIGRAM(S): at 05:42

## 2025-09-04 RX ADMIN — Medication 650 MILLIGRAM(S): at 11:36

## 2025-09-04 RX ADMIN — Medication 40 MILLIGRAM(S): at 05:21

## 2025-09-04 RX ADMIN — SERTRALINE 100 MILLIGRAM(S): 100 TABLET, FILM COATED ORAL at 11:36

## 2025-09-04 RX ADMIN — TRAMADOL HYDROCHLORIDE 50 MILLIGRAM(S): 50 TABLET, FILM COATED ORAL at 11:35

## 2025-09-04 RX ADMIN — TRAMADOL HYDROCHLORIDE 50 MILLIGRAM(S): 50 TABLET, FILM COATED ORAL at 23:28

## 2025-09-04 RX ADMIN — Medication 650 MILLIGRAM(S): at 05:21

## 2025-09-04 RX ADMIN — TRAMADOL HYDROCHLORIDE 25 MILLIGRAM(S): 50 TABLET, FILM COATED ORAL at 07:59

## 2025-09-04 RX ADMIN — AMLODIPINE BESYLATE 10 MILLIGRAM(S): 10 TABLET ORAL at 05:22

## 2025-09-04 RX ADMIN — POLYETHYLENE GLYCOL 3350 17 GRAM(S): 17 POWDER, FOR SOLUTION ORAL at 16:50

## 2025-09-04 RX ADMIN — Medication 650 MILLIGRAM(S): at 16:50

## 2025-09-04 RX ADMIN — METOPROLOL SUCCINATE 50 MILLIGRAM(S): 50 TABLET, EXTENDED RELEASE ORAL at 16:49

## 2025-09-05 ENCOUNTER — TRANSCRIPTION ENCOUNTER (OUTPATIENT)
Age: 80
End: 2025-09-05

## 2025-09-05 VITALS
SYSTOLIC BLOOD PRESSURE: 141 MMHG | HEART RATE: 57 BPM | DIASTOLIC BLOOD PRESSURE: 74 MMHG | RESPIRATION RATE: 18 BRPM | OXYGEN SATURATION: 93 % | TEMPERATURE: 99 F

## 2025-09-05 PROCEDURE — 80053 COMPREHEN METABOLIC PANEL: CPT

## 2025-09-05 PROCEDURE — 76377 3D RENDER W/INTRP POSTPROCES: CPT

## 2025-09-05 PROCEDURE — 97110 THERAPEUTIC EXERCISES: CPT

## 2025-09-05 PROCEDURE — 80307 DRUG TEST PRSMV CHEM ANLYZR: CPT

## 2025-09-05 PROCEDURE — 71045 X-RAY EXAM CHEST 1 VIEW: CPT

## 2025-09-05 PROCEDURE — 73502 X-RAY EXAM HIP UNI 2-3 VIEWS: CPT

## 2025-09-05 PROCEDURE — 81001 URINALYSIS AUTO W/SCOPE: CPT

## 2025-09-05 PROCEDURE — 99285 EMERGENCY DEPT VISIT HI MDM: CPT

## 2025-09-05 PROCEDURE — 73552 X-RAY EXAM OF FEMUR 2/>: CPT

## 2025-09-05 PROCEDURE — 84132 ASSAY OF SERUM POTASSIUM: CPT

## 2025-09-05 PROCEDURE — 97162 PT EVAL MOD COMPLEX 30 MIN: CPT

## 2025-09-05 PROCEDURE — 72190 X-RAY EXAM OF PELVIS: CPT

## 2025-09-05 PROCEDURE — 82306 VITAMIN D 25 HYDROXY: CPT

## 2025-09-05 PROCEDURE — 84484 ASSAY OF TROPONIN QUANT: CPT

## 2025-09-05 PROCEDURE — 85027 COMPLETE CBC AUTOMATED: CPT

## 2025-09-05 PROCEDURE — 72192 CT PELVIS W/O DYE: CPT

## 2025-09-05 PROCEDURE — 93005 ELECTROCARDIOGRAM TRACING: CPT

## 2025-09-05 PROCEDURE — 99239 HOSP IP/OBS DSCHRG MGMT >30: CPT

## 2025-09-05 PROCEDURE — 70450 CT HEAD/BRAIN W/O DYE: CPT

## 2025-09-05 PROCEDURE — 97116 GAIT TRAINING THERAPY: CPT

## 2025-09-05 PROCEDURE — 36415 COLL VENOUS BLD VENIPUNCTURE: CPT

## 2025-09-05 RX ORDER — TRAMADOL HYDROCHLORIDE 50 MG/1
1 TABLET, FILM COATED ORAL
Qty: 0 | Refills: 0 | DISCHARGE
Start: 2025-09-05

## 2025-09-05 RX ORDER — NALOXONE HYDROCHLORIDE 0.4 MG/ML
1 INJECTION, SOLUTION INTRAMUSCULAR; INTRAVENOUS; SUBCUTANEOUS
Qty: 1 | Refills: 0
Start: 2025-09-05 | End: 2025-09-05

## 2025-09-05 RX ORDER — ACETAMINOPHEN 500 MG/5ML
2 LIQUID (ML) ORAL
Refills: 0 | DISCHARGE

## 2025-09-05 RX ORDER — POLYETHYLENE GLYCOL 3350 17 G/17G
17 POWDER, FOR SOLUTION ORAL
Qty: 0 | Refills: 0 | DISCHARGE
Start: 2025-09-05

## 2025-09-05 RX ORDER — BISACODYL 5 MG
1 TABLET, DELAYED RELEASE (ENTERIC COATED) ORAL
Qty: 0 | Refills: 0 | DISCHARGE
Start: 2025-09-05

## 2025-09-05 RX ORDER — ACETAMINOPHEN 500 MG/5ML
2 LIQUID (ML) ORAL
Qty: 0 | Refills: 0 | DISCHARGE
Start: 2025-09-05

## 2025-09-05 RX ORDER — SENNA 187 MG
2 TABLET ORAL
Qty: 0 | Refills: 0 | DISCHARGE
Start: 2025-09-05

## 2025-09-05 RX ADMIN — METOPROLOL SUCCINATE 50 MILLIGRAM(S): 50 TABLET, EXTENDED RELEASE ORAL at 05:30

## 2025-09-05 RX ADMIN — AMLODIPINE BESYLATE 10 MILLIGRAM(S): 10 TABLET ORAL at 05:30

## 2025-09-05 RX ADMIN — Medication 650 MILLIGRAM(S): at 05:30

## 2025-09-05 RX ADMIN — LISINOPRIL 40 MILLIGRAM(S): 5 TABLET ORAL at 05:30

## 2025-09-05 RX ADMIN — SERTRALINE 100 MILLIGRAM(S): 100 TABLET, FILM COATED ORAL at 12:16

## 2025-09-05 RX ADMIN — Medication 650 MILLIGRAM(S): at 12:10

## 2025-09-05 RX ADMIN — Medication 40 MILLIGRAM(S): at 05:30
